# Patient Record
Sex: FEMALE | Race: BLACK OR AFRICAN AMERICAN | NOT HISPANIC OR LATINO | Employment: FULL TIME | URBAN - METROPOLITAN AREA
[De-identification: names, ages, dates, MRNs, and addresses within clinical notes are randomized per-mention and may not be internally consistent; named-entity substitution may affect disease eponyms.]

---

## 2023-04-14 PROBLEM — M25.551 RIGHT HIP PAIN: Status: ACTIVE | Noted: 2023-04-14

## 2023-04-14 PROBLEM — J30.2 ALLERGIC RHINOCONJUNCTIVITIS, SEASONAL AND PERENNIAL: Status: ACTIVE | Noted: 2023-04-14

## 2023-04-14 PROBLEM — M54.16 LUMBAR RADICULITIS: Status: ACTIVE | Noted: 2023-04-14

## 2023-04-14 PROBLEM — H52.203 ASTIGMATISM, BILATERAL: Status: ACTIVE | Noted: 2023-04-14

## 2023-04-14 PROBLEM — R10.11 COLICKY RUQ ABDOMINAL PAIN: Status: ACTIVE | Noted: 2023-04-14

## 2023-04-14 PROBLEM — G47.00 INSOMNIA: Status: ACTIVE | Noted: 2023-04-14

## 2023-04-14 PROBLEM — M16.11 PRIMARY LOCALIZED OSTEOARTHRITIS OF RIGHT HIP: Status: ACTIVE | Noted: 2023-04-14

## 2023-04-14 PROBLEM — M16.12 ARTHRITIS OF LEFT HIP: Status: ACTIVE | Noted: 2023-04-14

## 2023-04-14 PROBLEM — H18.603 KERATOCONUS OF BOTH EYES: Status: ACTIVE | Noted: 2023-04-14

## 2023-04-14 PROBLEM — Z98.84 BARIATRIC SURGERY STATUS: Status: ACTIVE | Noted: 2023-04-14

## 2023-04-14 PROBLEM — H52.203 MYOPIA OF BOTH EYES WITH ASTIGMATISM: Status: ACTIVE | Noted: 2023-04-14

## 2023-04-14 PROBLEM — E55.9 VITAMIN D DEFICIENCY: Status: ACTIVE | Noted: 2023-04-14

## 2023-04-14 PROBLEM — H52.13 MYOPIA OF BOTH EYES WITH ASTIGMATISM: Status: ACTIVE | Noted: 2023-04-14

## 2023-04-14 PROBLEM — J30.89 ALLERGIC RHINOCONJUNCTIVITIS, SEASONAL AND PERENNIAL: Status: ACTIVE | Noted: 2023-04-14

## 2023-04-14 PROBLEM — R06.83 SNORING: Status: ACTIVE | Noted: 2023-04-14

## 2023-04-14 PROBLEM — J30.2 SEASONAL ALLERGIES: Status: ACTIVE | Noted: 2023-04-14

## 2023-04-14 PROBLEM — S82.842A CLOSED BIMALLEOLAR FRACTURE OF LEFT ANKLE: Status: ACTIVE | Noted: 2023-04-14

## 2023-04-14 PROBLEM — R14.3 EXCESSIVE GAS: Status: ACTIVE | Noted: 2023-04-14

## 2023-04-14 PROBLEM — N93.9 ABNORMAL UTERINE BLEEDING: Status: ACTIVE | Noted: 2023-04-14

## 2023-04-14 PROBLEM — J45.909 ASTHMA (HHS-HCC): Status: ACTIVE | Noted: 2023-04-14

## 2023-04-14 PROBLEM — D21.9 FIBROIDS: Status: ACTIVE | Noted: 2023-04-14

## 2023-04-14 PROBLEM — R10.10 UPPER ABDOMINAL PAIN: Status: ACTIVE | Noted: 2023-04-14

## 2023-04-14 PROBLEM — Z98.84 S/P GASTRIC BYPASS: Status: ACTIVE | Noted: 2023-04-14

## 2023-04-14 PROBLEM — E87.20 ACID EXCESS: Status: ACTIVE | Noted: 2023-04-14

## 2023-04-14 PROBLEM — R26.9 ABNORMAL GAIT: Status: ACTIVE | Noted: 2023-04-14

## 2023-04-14 PROBLEM — F90.9 ATTENTION-DEFICIT/HYPERACTIVITY DISORDER: Status: ACTIVE | Noted: 2023-04-14

## 2023-04-14 PROBLEM — E22.1 HYPERPROLACTINEMIA (MULTI): Status: ACTIVE | Noted: 2023-04-14

## 2023-04-14 PROBLEM — R92.2 INCONCLUSIVE MAMMOGRAM: Status: ACTIVE | Noted: 2023-04-14

## 2023-04-14 PROBLEM — M51.9 LUMBAR DISC DISEASE: Status: ACTIVE | Noted: 2023-04-14

## 2023-04-14 PROBLEM — D17.30 LIPOMA OF SKIN AND SUBCUTANEOUS TISSUE: Status: ACTIVE | Noted: 2023-04-14

## 2023-04-14 PROBLEM — M79.7 FIBROMYALGIA: Status: ACTIVE | Noted: 2023-04-14

## 2023-04-14 PROBLEM — R73.9 ELEVATED BLOOD SUGAR: Status: ACTIVE | Noted: 2023-04-14

## 2023-04-14 PROBLEM — H10.10 ALLERGIC RHINOCONJUNCTIVITIS, SEASONAL AND PERENNIAL: Status: ACTIVE | Noted: 2023-04-14

## 2023-04-14 PROBLEM — R79.89 LOW SERUM CORTISOL LEVEL: Status: ACTIVE | Noted: 2023-04-14

## 2023-04-14 PROBLEM — Q45.3 ABNORMALITY OF PANCREATIC DUCT: Status: ACTIVE | Noted: 2023-04-14

## 2023-04-14 PROBLEM — F41.9 ANXIETY: Status: ACTIVE | Noted: 2023-04-14

## 2023-04-14 PROBLEM — Z98.84 HISTORY OF ROUX-EN-Y GASTRIC BYPASS: Status: ACTIVE | Noted: 2023-04-14

## 2023-04-14 PROBLEM — M47.816 LUMBAR FACET ARTHROPATHY: Status: ACTIVE | Noted: 2023-04-14

## 2023-04-14 RX ORDER — IPRATROPIUM BROMIDE AND ALBUTEROL SULFATE 2.5; .5 MG/3ML; MG/3ML
3 SOLUTION RESPIRATORY (INHALATION) EVERY 4 HOURS PRN
COMMUNITY
Start: 2023-03-31 | End: 2023-04-17 | Stop reason: ALTCHOICE

## 2023-04-14 RX ORDER — FLUTICASONE PROPIONATE 50 MCG
1 SPRAY, SUSPENSION (ML) NASAL
COMMUNITY
Start: 2023-03-31 | End: 2023-04-17 | Stop reason: SDUPTHER

## 2023-04-14 RX ORDER — VALACYCLOVIR HYDROCHLORIDE 500 MG/1
1 TABLET, FILM COATED ORAL DAILY
COMMUNITY
Start: 2019-03-04 | End: 2023-04-17 | Stop reason: SDUPTHER

## 2023-04-14 RX ORDER — ESCITALOPRAM OXALATE 10 MG/1
1 TABLET ORAL DAILY
COMMUNITY
Start: 2022-09-21 | End: 2023-04-17

## 2023-04-14 RX ORDER — ASPIRIN 325 MG
TABLET, DELAYED RELEASE (ENTERIC COATED) ORAL
COMMUNITY
End: 2023-04-17 | Stop reason: ALTCHOICE

## 2023-04-14 RX ORDER — ACETAMINOPHEN 500 MG
1 TABLET ORAL EVERY 4 HOURS PRN
COMMUNITY
Start: 2023-03-13 | End: 2023-04-19 | Stop reason: SDUPTHER

## 2023-04-14 RX ORDER — ACETAMINOPHEN, DIPHENHYDRAMINE HCL, PHENYLEPHRINE HCL 325; 25; 5 MG/1; MG/1; MG/1
TABLET ORAL
COMMUNITY
Start: 2021-05-06 | End: 2023-08-14 | Stop reason: SDUPTHER

## 2023-04-14 RX ORDER — TALC
1 POWDER (GRAM) TOPICAL NIGHTLY
COMMUNITY
Start: 2022-03-25 | End: 2023-04-17 | Stop reason: ALTCHOICE

## 2023-04-14 RX ORDER — FLUTICASONE PROPIONATE 50 MCG
SPRAY, SUSPENSION (ML) NASAL
COMMUNITY
Start: 2014-11-04 | End: 2023-04-17 | Stop reason: SDUPTHER

## 2023-04-14 RX ORDER — FERROUS SULFATE 325(65) MG
1 TABLET ORAL DAILY
COMMUNITY
End: 2023-04-17 | Stop reason: SDUPTHER

## 2023-04-14 RX ORDER — MONTELUKAST SODIUM 10 MG/1
1 TABLET ORAL NIGHTLY
COMMUNITY
End: 2023-04-17 | Stop reason: SDUPTHER

## 2023-04-14 RX ORDER — ASPIRIN 81 MG/1
1 TABLET ORAL 2 TIMES DAILY
COMMUNITY
Start: 2022-11-11 | End: 2023-04-17 | Stop reason: ALTCHOICE

## 2023-04-14 RX ORDER — MOMETASONE FUROATE AND FORMOTEROL FUMARATE DIHYDRATE 50; 5 UG/1; UG/1
AEROSOL RESPIRATORY (INHALATION) 2 TIMES DAILY
COMMUNITY
End: 2023-04-17 | Stop reason: ALTCHOICE

## 2023-04-14 RX ORDER — AMOXICILLIN 500 MG/1
CAPSULE ORAL
COMMUNITY
Start: 2023-02-23 | End: 2023-04-17 | Stop reason: ALTCHOICE

## 2023-04-14 RX ORDER — AZELASTINE HYDROCHLORIDE 0.5 MG/ML
SOLUTION/ DROPS OPHTHALMIC
COMMUNITY
End: 2023-04-17 | Stop reason: ALTCHOICE

## 2023-04-14 RX ORDER — LORATADINE 10 MG/1
10 TABLET ORAL
Qty: 30 TABLET | Refills: 71 | COMMUNITY
Start: 2017-06-06 | End: 2023-04-17 | Stop reason: SDUPTHER

## 2023-04-14 RX ORDER — MOMETASONE FUROATE AND FORMOTEROL FUMARATE DIHYDRATE 200; 5 UG/1; UG/1
1 AEROSOL RESPIRATORY (INHALATION) 2 TIMES DAILY
COMMUNITY
Start: 2023-03-31 | End: 2023-04-17 | Stop reason: SDUPTHER

## 2023-04-14 RX ORDER — ALBUTEROL SULFATE 0.83 MG/ML
2.5 SOLUTION RESPIRATORY (INHALATION) EVERY 4 HOURS PRN
COMMUNITY
Start: 2012-10-05 | End: 2024-05-20 | Stop reason: SDUPTHER

## 2023-04-14 RX ORDER — IBUPROFEN 600 MG/1
TABLET ORAL EVERY 6 HOURS
COMMUNITY
Start: 2022-03-29 | End: 2023-04-17 | Stop reason: ALTCHOICE

## 2023-04-14 RX ORDER — OMALIZUMAB 202.5 MG/1.4ML
INJECTION, SOLUTION SUBCUTANEOUS
COMMUNITY
Start: 2019-03-28 | End: 2023-04-17 | Stop reason: SDUPTHER

## 2023-04-14 RX ORDER — AMOXICILLIN AND CLAVULANATE POTASSIUM 875; 125 MG/1; MG/1
1 TABLET, FILM COATED ORAL 2 TIMES DAILY
COMMUNITY
Start: 2023-03-27 | End: 2023-04-17 | Stop reason: ALTCHOICE

## 2023-04-14 RX ORDER — MOMETASONE FUROATE AND FORMOTEROL FUMARATE DIHYDRATE 200; 5 UG/1; UG/1
2 AEROSOL RESPIRATORY (INHALATION) DAILY
COMMUNITY
Start: 2020-04-13 | End: 2023-04-17 | Stop reason: SDUPTHER

## 2023-04-14 RX ORDER — OMALIZUMAB 150 MG/ML
INJECTION, SOLUTION SUBCUTANEOUS
COMMUNITY
End: 2024-05-20

## 2023-04-17 ENCOUNTER — LAB (OUTPATIENT)
Dept: LAB | Facility: LAB | Age: 48
End: 2023-04-17
Payer: COMMERCIAL

## 2023-04-17 ENCOUNTER — OFFICE VISIT (OUTPATIENT)
Dept: PRIMARY CARE | Facility: CLINIC | Age: 48
End: 2023-04-17
Payer: COMMERCIAL

## 2023-04-17 VITALS
SYSTOLIC BLOOD PRESSURE: 120 MMHG | TEMPERATURE: 96.7 F | HEIGHT: 67 IN | BODY MASS INDEX: 23.86 KG/M2 | DIASTOLIC BLOOD PRESSURE: 70 MMHG | WEIGHT: 152 LBS

## 2023-04-17 DIAGNOSIS — R79.89 ELEVATED SERUM FREE T4 LEVEL: ICD-10-CM

## 2023-04-17 DIAGNOSIS — J45.40 MODERATE PERSISTENT ASTHMA WITHOUT COMPLICATION (HHS-HCC): ICD-10-CM

## 2023-04-17 DIAGNOSIS — E87.1 HYPONATREMIA: ICD-10-CM

## 2023-04-17 DIAGNOSIS — R25.2 LEG CRAMPS: ICD-10-CM

## 2023-04-17 DIAGNOSIS — D64.9 ANEMIA, UNSPECIFIED TYPE: ICD-10-CM

## 2023-04-17 DIAGNOSIS — J30.89 ALLERGIC RHINOCONJUNCTIVITIS, SEASONAL AND PERENNIAL: ICD-10-CM

## 2023-04-17 DIAGNOSIS — H10.10 ALLERGIC RHINOCONJUNCTIVITIS, SEASONAL AND PERENNIAL: ICD-10-CM

## 2023-04-17 DIAGNOSIS — B00.9 HSV (HERPES SIMPLEX VIRUS) INFECTION: Primary | ICD-10-CM

## 2023-04-17 DIAGNOSIS — J30.2 ALLERGIC RHINOCONJUNCTIVITIS, SEASONAL AND PERENNIAL: ICD-10-CM

## 2023-04-17 DIAGNOSIS — F51.01 PRIMARY INSOMNIA: ICD-10-CM

## 2023-04-17 PROBLEM — E22.1 HYPERPROLACTINEMIA (MULTI): Status: RESOLVED | Noted: 2023-04-14 | Resolved: 2023-04-17

## 2023-04-17 PROBLEM — S82.842A CLOSED BIMALLEOLAR FRACTURE OF LEFT ANKLE: Status: RESOLVED | Noted: 2023-04-14 | Resolved: 2023-04-17

## 2023-04-17 PROBLEM — R10.10 UPPER ABDOMINAL PAIN: Status: RESOLVED | Noted: 2023-04-14 | Resolved: 2023-04-17

## 2023-04-17 PROBLEM — R10.11 COLICKY RUQ ABDOMINAL PAIN: Status: RESOLVED | Noted: 2023-04-14 | Resolved: 2023-04-17

## 2023-04-17 PROBLEM — Z98.84 BARIATRIC SURGERY STATUS: Status: RESOLVED | Noted: 2023-04-14 | Resolved: 2023-04-17

## 2023-04-17 PROBLEM — R14.3 EXCESSIVE GAS: Status: RESOLVED | Noted: 2023-04-14 | Resolved: 2023-04-17

## 2023-04-17 PROBLEM — R26.9 ABNORMAL GAIT: Status: RESOLVED | Noted: 2023-04-14 | Resolved: 2023-04-17

## 2023-04-17 PROBLEM — Z98.84 S/P GASTRIC BYPASS: Status: RESOLVED | Noted: 2023-04-14 | Resolved: 2023-04-17

## 2023-04-17 PROBLEM — N93.9 ABNORMAL UTERINE BLEEDING: Status: RESOLVED | Noted: 2023-04-14 | Resolved: 2023-04-17

## 2023-04-17 PROBLEM — R73.9 ELEVATED BLOOD SUGAR: Status: RESOLVED | Noted: 2023-04-14 | Resolved: 2023-04-17

## 2023-04-17 PROBLEM — M25.551 RIGHT HIP PAIN: Status: RESOLVED | Noted: 2023-04-14 | Resolved: 2023-04-17

## 2023-04-17 PROCEDURE — 83735 ASSAY OF MAGNESIUM: CPT

## 2023-04-17 PROCEDURE — 84443 ASSAY THYROID STIM HORMONE: CPT

## 2023-04-17 PROCEDURE — 36415 COLL VENOUS BLD VENIPUNCTURE: CPT

## 2023-04-17 PROCEDURE — 80053 COMPREHEN METABOLIC PANEL: CPT

## 2023-04-17 PROCEDURE — 84436 ASSAY OF TOTAL THYROXINE: CPT

## 2023-04-17 PROCEDURE — 99214 OFFICE O/P EST MOD 30 MIN: CPT

## 2023-04-17 RX ORDER — LORATADINE 10 MG/1
10 TABLET ORAL
Qty: 90 TABLET | Refills: 0 | Status: SHIPPED | OUTPATIENT
Start: 2023-04-17 | End: 2023-08-14 | Stop reason: SDUPTHER

## 2023-04-17 RX ORDER — VALACYCLOVIR HYDROCHLORIDE 500 MG/1
500 TABLET, FILM COATED ORAL DAILY
Qty: 90 TABLET | Refills: 0 | Status: SHIPPED | OUTPATIENT
Start: 2023-04-17 | End: 2023-08-09

## 2023-04-17 RX ORDER — FLUTICASONE PROPIONATE 50 MCG
2 SPRAY, SUSPENSION (ML) NASAL
Qty: 16 G | Refills: 2 | Status: SHIPPED | OUTPATIENT
Start: 2023-04-17

## 2023-04-17 RX ORDER — LORATADINE 10 MG/1
10 TABLET ORAL DAILY
COMMUNITY
Start: 2022-11-02 | End: 2023-04-17 | Stop reason: ALTCHOICE

## 2023-04-17 RX ORDER — TRAZODONE HYDROCHLORIDE 50 MG/1
50 TABLET ORAL NIGHTLY PRN
Qty: 90 TABLET | Refills: 0 | Status: SHIPPED | OUTPATIENT
Start: 2023-04-17 | End: 2024-04-16

## 2023-04-17 RX ORDER — MONTELUKAST SODIUM 10 MG/1
10 TABLET ORAL NIGHTLY
Qty: 90 TABLET | Refills: 0 | Status: SHIPPED | OUTPATIENT
Start: 2023-04-17

## 2023-04-17 RX ORDER — FERROUS SULFATE 325(65) MG
1 TABLET ORAL DAILY
Qty: 90 TABLET | Refills: 0 | Status: SHIPPED | OUTPATIENT
Start: 2023-04-17 | End: 2023-08-14 | Stop reason: SDUPTHER

## 2023-04-17 RX ORDER — MOMETASONE FUROATE AND FORMOTEROL FUMARATE DIHYDRATE 200; 5 UG/1; UG/1
1 AEROSOL RESPIRATORY (INHALATION)
Qty: 13 G | Refills: 2 | Status: SHIPPED | OUTPATIENT
Start: 2023-04-17 | End: 2023-05-17

## 2023-04-17 NOTE — PROGRESS NOTES
"Subjective   Patient ID: Deirdre Rodriguez is a 47 y.o. female who presents for Follow-up.  HPI  Patient is a 47 year old AA female with PMH of right hip replacement, asthma, insomnia, presents for follow up.   Last week took amoxacillin and prednisone for asthma, was prescribed by allergy for asthma flare.   Back to baseline at this time.   Last visit was having poor sleep, was waking in the middle of the night and having difficulty falling back asleep. Was napping significantly during the day. Now not napping frequently, happy with trazodone.   Has not been using CPAP during the night, uncomfortable with the mask. Daytime fatigue.     Had lasb drawn by allergy in February, sodium was 126, will repeat today. Has been noticing pain in the hands     All systems have been reviewed and are negative for complaint other than those mentioned in the HPI.     Objective   /70 (BP Location: Left arm, Patient Position: Sitting, BP Cuff Size: Adult)   Temp 35.9 °C (96.7 °F) (Temporal)   Ht 1.702 m (5' 7\")   Wt 68.9 kg (152 lb)   BMI 23.81 kg/m²    Physical Exam  Constitutional:       General: She is awake.      Appearance: Normal appearance.   HENT:      Head: Normocephalic and atraumatic.   Eyes:      Extraocular Movements: Extraocular movements intact.      Pupils: Pupils are equal, round, and reactive to light.   Cardiovascular:      Rate and Rhythm: Normal rate and regular rhythm.      Heart sounds: S1 normal and S2 normal. No murmur heard.  Pulmonary:      Effort: Pulmonary effort is normal.      Breath sounds: Normal breath sounds.   Musculoskeletal:      Cervical back: Normal range of motion and neck supple.      Right lower leg: No edema.      Left lower leg: No edema.   Skin:     General: Skin is warm and dry.   Neurological:      General: No focal deficit present.      Mental Status: She is alert and oriented to person, place, and time.   Psychiatric:         Mood and Affect: Mood and affect normal.         " Behavior: Behavior normal. Behavior is cooperative.         Thought Content: Thought content normal.         Judgment: Judgment normal.     Deirdre was seen today for follow-up.  Diagnoses and all orders for this visit:  HSV (herpes simplex virus) infection (Primary)  -     Stable, no recent flares  - valACYclovir (Valtrex) 500 mg tablet; Take 1 tablet (500 mg) by mouth once daily.  Moderate persistent asthma without complication  -     Recent exacerbation, cleared with prednisone and abx.   -  Will continue to monitor.   - mometasone-formoterol (Dulera) 200-5 mcg/actuation inhaler; Inhale 1 puff  in the morning and 1 puff in the evening.  -     montelukast (Singulair) 10 mg tablet; Take 1 tablet (10 mg) by mouth once daily at bedtime.  Allergic rhinoconjunctivitis, seasonal and perennial  -    Stable, continue current meds.   -  loratadine (Claritin) 10 mg tablet; Take 1 tablet (10 mg) by mouth once daily.  -     fluticasone (Flonase) 50 mcg/actuation nasal spray; Administer 2 sprays into each nostril once daily.  Anemia, unspecified type  -     Stable, continue current meds  - ferrous sulfate (Iron, ferrous sulfate,) 325 (65 Fe) MG tablet; Take 1 tablet (325 mg) by mouth once daily.  Hyponatremia  -     Hyponatremia noted on labs drawn by allergist. Will redraw today. Patient does report frequent leg cramps, has recently started taking potassium and magnesium supplements.   - Comprehensive Metabolic Panel; Future  Leg cramps  -     Leg cramps at night for the past 2 months. Will check electrolytes and magnesium. Not occurring while walking, low concern for claudication  - Magnesium; Future  Elevated serum free T4 level  -     Elevated T4 noted on last lab draw, will redraw.   - TSH; Future  -     T4; Future  Primary insomnia  -    Saw sleep medicine, improved on trazodone. Continue current meds.   -  traZODone (Desyrel) 50 mg tablet; Take 1 tablet (50 mg) by mouth as needed at bedtime for sleep.    Follow up in 3  months

## 2023-04-18 LAB
ALANINE AMINOTRANSFERASE (SGPT) (U/L) IN SER/PLAS: 45 U/L (ref 7–45)
ALBUMIN (G/DL) IN SER/PLAS: 3.9 G/DL (ref 3.4–5)
ALKALINE PHOSPHATASE (U/L) IN SER/PLAS: 40 U/L (ref 33–110)
ANION GAP IN SER/PLAS: 12 MMOL/L (ref 10–20)
ASPARTATE AMINOTRANSFERASE (SGOT) (U/L) IN SER/PLAS: 45 U/L (ref 9–39)
BILIRUBIN TOTAL (MG/DL) IN SER/PLAS: 0.3 MG/DL (ref 0–1.2)
CALCIUM (MG/DL) IN SER/PLAS: 8.8 MG/DL (ref 8.6–10.6)
CARBON DIOXIDE, TOTAL (MMOL/L) IN SER/PLAS: 27 MMOL/L (ref 21–32)
CHLORIDE (MMOL/L) IN SER/PLAS: 106 MMOL/L (ref 98–107)
CREATININE (MG/DL) IN SER/PLAS: 0.76 MG/DL (ref 0.5–1.05)
GFR FEMALE: >90 ML/MIN/1.73M2
GLUCOSE (MG/DL) IN SER/PLAS: 89 MG/DL (ref 74–99)
MAGNESIUM (MG/DL) IN SER/PLAS: 2.25 MG/DL (ref 1.6–2.4)
POTASSIUM (MMOL/L) IN SER/PLAS: 4.6 MMOL/L (ref 3.5–5.3)
PROTEIN TOTAL: 6.3 G/DL (ref 6.4–8.2)
SODIUM (MMOL/L) IN SER/PLAS: 140 MMOL/L (ref 136–145)
THYROTROPIN (MIU/L) IN SER/PLAS BY DETECTION LIMIT <= 0.05 MIU/L: 0.92 MIU/L (ref 0.44–3.98)
THYROXINE (T4) (UG/DL) IN SER/PLAS: 5.8 UG/DL (ref 4.5–11.1)
UREA NITROGEN (MG/DL) IN SER/PLAS: 14 MG/DL (ref 6–23)

## 2023-04-19 DIAGNOSIS — R52 PAIN: Primary | ICD-10-CM

## 2023-04-19 RX ORDER — IBUPROFEN 600 MG/1
600 TABLET ORAL EVERY 8 HOURS PRN
Qty: 30 TABLET | Refills: 2 | OUTPATIENT
Start: 2023-04-19

## 2023-04-19 RX ORDER — ACETAMINOPHEN 500 MG
500 TABLET ORAL EVERY 4 HOURS PRN
Qty: 30 TABLET | Refills: 2 | Status: SHIPPED | OUTPATIENT
Start: 2023-04-19

## 2023-07-17 ENCOUNTER — APPOINTMENT (OUTPATIENT)
Dept: PRIMARY CARE | Facility: CLINIC | Age: 48
End: 2023-07-17
Payer: COMMERCIAL

## 2023-08-08 DIAGNOSIS — B00.9 HSV (HERPES SIMPLEX VIRUS) INFECTION: ICD-10-CM

## 2023-08-09 RX ORDER — VALACYCLOVIR HYDROCHLORIDE 500 MG/1
500 TABLET, FILM COATED ORAL DAILY
Qty: 90 TABLET | Refills: 0 | Status: SHIPPED | OUTPATIENT
Start: 2023-08-09 | End: 2023-08-14 | Stop reason: SDUPTHER

## 2023-08-14 ENCOUNTER — OFFICE VISIT (OUTPATIENT)
Dept: PRIMARY CARE | Facility: CLINIC | Age: 48
End: 2023-08-14
Payer: COMMERCIAL

## 2023-08-14 VITALS — DIASTOLIC BLOOD PRESSURE: 70 MMHG | SYSTOLIC BLOOD PRESSURE: 115 MMHG

## 2023-08-14 DIAGNOSIS — J30.2 ALLERGIC RHINOCONJUNCTIVITIS, SEASONAL AND PERENNIAL: ICD-10-CM

## 2023-08-14 DIAGNOSIS — D64.9 ANEMIA, UNSPECIFIED TYPE: ICD-10-CM

## 2023-08-14 DIAGNOSIS — B00.9 HSV (HERPES SIMPLEX VIRUS) INFECTION: ICD-10-CM

## 2023-08-14 DIAGNOSIS — J30.89 ALLERGIC RHINOCONJUNCTIVITIS, SEASONAL AND PERENNIAL: ICD-10-CM

## 2023-08-14 DIAGNOSIS — H10.10 ALLERGIC RHINOCONJUNCTIVITIS, SEASONAL AND PERENNIAL: ICD-10-CM

## 2023-08-14 PROCEDURE — 1036F TOBACCO NON-USER: CPT

## 2023-08-14 PROCEDURE — 99213 OFFICE O/P EST LOW 20 MIN: CPT

## 2023-08-14 RX ORDER — FERROUS SULFATE 325(65) MG
1 TABLET ORAL DAILY
Qty: 90 TABLET | Refills: 0 | Status: SHIPPED | OUTPATIENT
Start: 2023-08-14 | End: 2023-11-13 | Stop reason: SDUPTHER

## 2023-08-14 RX ORDER — ACETAMINOPHEN, DIPHENHYDRAMINE HCL, PHENYLEPHRINE HCL 325; 25; 5 MG/1; MG/1; MG/1
1 TABLET ORAL
Qty: 90 TABLET | Refills: 0 | Status: SHIPPED | OUTPATIENT
Start: 2023-08-14 | End: 2023-10-27

## 2023-08-14 RX ORDER — VALACYCLOVIR HYDROCHLORIDE 500 MG/1
500 TABLET, FILM COATED ORAL DAILY
Qty: 90 TABLET | Refills: 0 | Status: SHIPPED | OUTPATIENT
Start: 2023-08-14 | End: 2023-11-13 | Stop reason: SDUPTHER

## 2023-08-14 RX ORDER — LORATADINE 10 MG/1
10 TABLET ORAL
Qty: 90 TABLET | Refills: 0 | Status: SHIPPED | OUTPATIENT
Start: 2023-08-14 | End: 2023-11-12

## 2023-08-14 ASSESSMENT — LIFESTYLE VARIABLES
HOW MANY STANDARD DRINKS CONTAINING ALCOHOL DO YOU HAVE ON A TYPICAL DAY: 1 OR 2
HOW OFTEN DO YOU HAVE SIX OR MORE DRINKS ON ONE OCCASION: NEVER
SKIP TO QUESTIONS 9-10: 1
AUDIT-C TOTAL SCORE: 2
HOW OFTEN DO YOU HAVE A DRINK CONTAINING ALCOHOL: 2-4 TIMES A MONTH

## 2023-08-14 ASSESSMENT — PATIENT HEALTH QUESTIONNAIRE - PHQ9
2. FEELING DOWN, DEPRESSED OR HOPELESS: NOT AT ALL
1. LITTLE INTEREST OR PLEASURE IN DOING THINGS: NOT AT ALL
SUM OF ALL RESPONSES TO PHQ9 QUESTIONS 1 & 2: 0

## 2023-08-14 NOTE — PROGRESS NOTES
Subjective   Patient ID: Deirdre Rodriguez is a 47 y.o. female who presents for Follow-up.  HPI  Patient is a 47 year old AA female with PMH of right hip replacement, severe allergic asthma, insomnia, presents for follow up.     Asthma: Dulera, singulair, albuterol PRN. Sees pulm at [facility], doing well.   Insomnia: trazodone --> hasn't been needing it, sleeping without it.   Allergic rhinitis: claritin, flonase, omalizumab injection, stable.   HSV: valacyclovir 500mg daily, no recent flares.     Due for pap, will contact OBGYN.     All systems have been reviewed and are negative for complaint other than those mentioned in the HPI.     Objective   /70 (BP Location: Left arm, Patient Position: Sitting, BP Cuff Size: Adult)    Physical Exam  Constitutional:       General: She is awake.      Appearance: Normal appearance.   HENT:      Head: Normocephalic and atraumatic.   Eyes:      Extraocular Movements: Extraocular movements intact.      Pupils: Pupils are equal, round, and reactive to light.   Cardiovascular:      Rate and Rhythm: Normal rate and regular rhythm.      Heart sounds: S1 normal and S2 normal. No murmur heard.  Pulmonary:      Effort: Pulmonary effort is normal.      Breath sounds: Normal breath sounds.   Musculoskeletal:      Cervical back: Normal range of motion and neck supple.      Right lower leg: No edema.      Left lower leg: No edema.   Skin:     General: Skin is warm and dry.   Neurological:      General: No focal deficit present.      Mental Status: She is alert and oriented to person, place, and time.   Psychiatric:         Mood and Affect: Mood and affect normal.         Behavior: Behavior normal. Behavior is cooperative.         Thought Content: Thought content normal.         Judgment: Judgment normal.   Deirdre was seen today for follow-up.  Diagnoses and all orders for this visit:  HSV (herpes simplex virus) infection  -     Stable, meds refilled. No recent lesia.   - valACYclovir  (Valtrex) 500 mg tablet; Take 1 tablet (500 mg) by mouth once daily.  Allergic rhinoconjunctivitis, seasonal and perennial  -    Stable, receiving allergy injections, continue current management.   -  loratadine (Claritin) 10 mg tablet; Take 1 tablet (10 mg) by mouth once daily.  Anemia, unspecified type  -     Stable, labs looked great last check. Repeat next appointment.   - cyanocobalamin, vitamin B-12, 5,000 mcg tablet, sublingual; Place 1 tablet under the tongue once daily.  -     ferrous sulfate (Iron, ferrous sulfate,) 325 (65 Fe) MG tablet; Take 1 tablet (325 mg) by mouth once daily.    Follow up in 3 months.

## 2023-10-26 DIAGNOSIS — D64.9 ANEMIA, UNSPECIFIED TYPE: ICD-10-CM

## 2023-10-27 RX ORDER — NICOTINE POLACRILEX 2 MG
LOZENGE BUCCAL
Qty: 90 TABLET | Refills: 0 | Status: SHIPPED | OUTPATIENT
Start: 2023-10-27

## 2023-11-07 ENCOUNTER — OFFICE VISIT (OUTPATIENT)
Dept: ORTHOPEDIC SURGERY | Facility: CLINIC | Age: 48
End: 2023-11-07
Payer: COMMERCIAL

## 2023-11-07 ENCOUNTER — ANCILLARY PROCEDURE (OUTPATIENT)
Dept: RADIOLOGY | Facility: CLINIC | Age: 48
End: 2023-11-07
Payer: COMMERCIAL

## 2023-11-07 DIAGNOSIS — Z96.642 AFTERCARE FOLLOWING LEFT HIP JOINT REPLACEMENT SURGERY: ICD-10-CM

## 2023-11-07 DIAGNOSIS — Z47.1 AFTERCARE FOLLOWING LEFT HIP JOINT REPLACEMENT SURGERY: ICD-10-CM

## 2023-11-07 PROCEDURE — 1036F TOBACCO NON-USER: CPT | Performed by: STUDENT IN AN ORGANIZED HEALTH CARE EDUCATION/TRAINING PROGRAM

## 2023-11-07 PROCEDURE — 73502 X-RAY EXAM HIP UNI 2-3 VIEWS: CPT | Mod: LEFT SIDE | Performed by: RADIOLOGY

## 2023-11-07 PROCEDURE — 73502 X-RAY EXAM HIP UNI 2-3 VIEWS: CPT | Mod: LT,FY

## 2023-11-07 PROCEDURE — 99213 OFFICE O/P EST LOW 20 MIN: CPT | Mod: 25 | Performed by: STUDENT IN AN ORGANIZED HEALTH CARE EDUCATION/TRAINING PROGRAM

## 2023-11-07 PROCEDURE — 99213 OFFICE O/P EST LOW 20 MIN: CPT | Performed by: STUDENT IN AN ORGANIZED HEALTH CARE EDUCATION/TRAINING PROGRAM

## 2023-11-07 NOTE — PROGRESS NOTES
Diagnosis: End stage OA Left hip  Procedure Performed: Left total hip arthroplasty   Date of Surgery: November 11, 2022     Subjective:  BRANDY RODRIGUEZ is here for regularly scheduled follow-up after the above procedure. Ms. Rodriguez continues to be satisfied with the outcome following her hip replacement.  She has returned to full activities without limitation.  She has occasional achy pain.  She does not complain of any instability.  No interval fall or trauma. The patient denies: fevers, chills, incisional drainage, joint instability, chest or calf pain, shortness of breath, and night sweats.      There has been no interval change in this patient's past medical, surgical, medications, allergies, family history or social history since the most recent visit to a provider within our department.  14 point review of systems was performed, reviewed, and negative except for pertinent positives documented in the history of present illness.    Physical Examination:   General: Patient is alert and oriented x 3 and appears comfortable.  Gait: Patient ambulates with slight antalgic gait.  Wound: Incision is well healed. There is no erythema, incisional drainage, fluctuance, or suture abscess.   Hip Exam: No pain with range of motion of the hip  Knee: Painless ROM of the knee.   Calf: No swelling or tenderness  Able to dorsi-flex and plantar-flex the ankle with appropriate strength. Able to wiggle all toes.   The foot is warm and well perfused with palpable pulses.   Sensation is intact to light touch.   Full strength in her quadriceps.     Radiographs: AP Pelvis, frog lateral shoot through lateral: Left total hip arthroplasty in place. There is no evidence of subsidence, fracture or dislocation. The joint is located. Compared to immediate post-operative x-rays, no change in appearance.      Assessment and Plan: The patient is progressing well approximately 1 year s/p left total hip arthroplasty.  I am satisfied with her  recovery to this point.    1. A thorough discussion was had with the patient concerning the postoperative course and the patient is in agreement with the plan.  2. Continue exercising. While she does not have to abide by strict posterior hip precautions, I reviewed with the patient that she could still dislocate the joint. I reviewed safe ways to perform activities that minimize the likelihood of a dislocation.  3. Tylenol or other nonsteroidal anti-inflammatories as needed/tolerated for pain and stiffness.  4. Reviewed the need for prophylactic antibiotics prior to any dental or other invasive procedures.  5. Follow-up in 3 years with radiographs or sooner if there are any concerns. We discussed that given her young age, she is at increased risk of requiring a revision surgery in the future. She understands to avoid high impact activities and exercise.        ** This office note was dictated using Dragon voice to text software and was not proofread for spelling or grammatical errors *.

## 2023-11-13 ENCOUNTER — TELEMEDICINE (OUTPATIENT)
Dept: PRIMARY CARE | Facility: CLINIC | Age: 48
End: 2023-11-13
Payer: COMMERCIAL

## 2023-11-13 VITALS — BODY MASS INDEX: 24.33 KG/M2 | WEIGHT: 155 LBS | HEIGHT: 67 IN

## 2023-11-13 DIAGNOSIS — D64.9 ANEMIA, UNSPECIFIED TYPE: ICD-10-CM

## 2023-11-13 DIAGNOSIS — B00.9 HSV (HERPES SIMPLEX VIRUS) INFECTION: ICD-10-CM

## 2023-11-13 DIAGNOSIS — J06.9 ACUTE URI: Primary | ICD-10-CM

## 2023-11-13 PROCEDURE — 99214 OFFICE O/P EST MOD 30 MIN: CPT

## 2023-11-13 RX ORDER — FERROUS SULFATE 325(65) MG
1 TABLET ORAL DAILY
Qty: 90 TABLET | Refills: 1 | Status: SHIPPED | OUTPATIENT
Start: 2023-11-13

## 2023-11-13 RX ORDER — VALACYCLOVIR HYDROCHLORIDE 500 MG/1
500 TABLET, FILM COATED ORAL DAILY
Qty: 90 TABLET | Refills: 1 | Status: SHIPPED | OUTPATIENT
Start: 2023-11-13 | End: 2024-05-20 | Stop reason: SDUPTHER

## 2023-11-13 NOTE — PROGRESS NOTES
Subjective   Patient ID: Deirdre Rodriguez is a 48 y.o. female who presents via virtual visit for Follow-up.  An interactive audio and video telecommunication system which permits real time communications between the patient (at the originating site) and provider (at the distant site) was utilized to provide this telehealth service.    Verbal consent was requested and obtained from the patient on the day of encounter.  HPI  Patient is a 47 year old AA female with PMH of right hip replacement, severe allergic asthma, insomnia, presents for follow up.      Asthma: Dulera, singulair, albuterol PRN. Sees pulm at [facility], doing well.   Insomnia: trazodone --> hasn't been needing it, sleeping without it.   Allergic rhinitis: claritin, flonase, omalizumab injection, stable.   HSV: valacyclovir 500mg daily, no recent flares.     All systems have been reviewed and are negative for complaint other than those mentioned in the HPI.     Objective   Physical Exam  Constitutional:       Appearance: Normal appearance.   Pulmonary:      Effort: Pulmonary effort is normal.   Neurological:      General: No focal deficit present.      Mental Status: She is alert and oriented to person, place, and time.   Psychiatric:         Mood and Affect: Mood normal.         Behavior: Behavior normal.     Deirdre was seen today for follow-up.  Diagnoses and all orders for this visit:  Acute URI (Primary)  -     Nasal congestion and cough, no fevers, chills, chest pain, SOB.   - Will treat symptomatically. Continue flonase and antihistamine  - dextromethorphan-guaifenesin (Mucinex DM)  mg 12 hr tablet; Take 1 tablet by mouth every 12 hours for 15 days. Do not crush, chew, or split.  HSV (herpes simplex virus) infection  -    Stable, no recent flares, continue current medication.   -  valACYclovir (Valtrex) 500 mg tablet; Take 1 tablet (500 mg) by mouth once daily.  Anemia, unspecified type  -     ferrous sulfate, 325 mg ferrous sulfate, (Iron,  ferrous sulfate,) tablet; Take 1 tablet (325 mg) by mouth once daily.    Follow up in 4 months.

## 2023-11-29 ENCOUNTER — OFFICE VISIT (OUTPATIENT)
Dept: PRIMARY CARE | Facility: CLINIC | Age: 48
End: 2023-11-29
Payer: COMMERCIAL

## 2023-11-29 ENCOUNTER — LAB (OUTPATIENT)
Dept: LAB | Facility: LAB | Age: 48
End: 2023-11-29
Payer: COMMERCIAL

## 2023-11-29 VITALS — SYSTOLIC BLOOD PRESSURE: 109 MMHG | BODY MASS INDEX: 24.64 KG/M2 | DIASTOLIC BLOOD PRESSURE: 74 MMHG | HEIGHT: 67 IN

## 2023-11-29 DIAGNOSIS — Z71.85 VACCINE COUNSELING: ICD-10-CM

## 2023-11-29 DIAGNOSIS — Z71.85 VACCINE COUNSELING: Primary | ICD-10-CM

## 2023-11-29 LAB — HBV SURFACE AB SER-ACNC: 76.2 MIU/ML

## 2023-11-29 PROCEDURE — 86706 HEP B SURFACE ANTIBODY: CPT

## 2023-11-29 PROCEDURE — 1036F TOBACCO NON-USER: CPT

## 2023-11-29 PROCEDURE — 99213 OFFICE O/P EST LOW 20 MIN: CPT

## 2023-11-29 PROCEDURE — 36415 COLL VENOUS BLD VENIPUNCTURE: CPT

## 2023-11-29 ASSESSMENT — PATIENT HEALTH QUESTIONNAIRE - PHQ9
1. LITTLE INTEREST OR PLEASURE IN DOING THINGS: NOT AT ALL
2. FEELING DOWN, DEPRESSED OR HOPELESS: NOT AT ALL
SUM OF ALL RESPONSES TO PHQ9 QUESTIONS 1 AND 2: 0

## 2023-11-29 NOTE — PROGRESS NOTES
"Subjective   Patient ID: Deirdre Rodriguez is a 48 y.o. female who presents for discus immunization.  HPI  Patient is a 47 year old AA female with PMH of right hip replacement, severe allergic asthma, insomnia, presents for question regarding immunization.      Asthma: Dulera, singulair, albuterol PRN. Se[facility] pulm at [facility], doing well.   Insomnia: trazodone --> hasn't been needing it, sleeping without it.   Allergic rhinitis: claritin, flonase, omalizumab injection  HSV: valacyclovir 500mg daily    States that she received a message from the [facility] that she is overdue for a heptatis B vaccine. Patient is unsure if she has received Hepatitis B vaccination in the past.     All systems have been reviewed and are negative for complaint other than those mentioned in the HPI.     Objective   /74 (BP Location: Left arm, Patient Position: Sitting, BP Cuff Size: Adult)   Ht 1.689 m (5' 6.5\")   BMI 24.64 kg/m²    Physical Exam  Constitutional:       General: She is awake.      Appearance: Normal appearance.   HENT:      Head: Normocephalic and atraumatic.   Eyes:      Extraocular Movements: Extraocular movements intact.      Pupils: Pupils are equal, round, and reactive to light.   Cardiovascular:      Rate and Rhythm: Normal rate and regular rhythm.      Heart sounds: S1 normal and S2 normal. No murmur heard.  Pulmonary:      Effort: Pulmonary effort is normal.      Breath sounds: Normal breath sounds.   Musculoskeletal:      Cervical back: Normal range of motion and neck supple.      Right lower leg: No edema.      Left lower leg: No edema.   Skin:     General: Skin is warm and dry.   Neurological:      General: No focal deficit present.      Mental Status: She is alert and oriented to person, place, and time.   Psychiatric:         Mood and Affect: Mood and affect normal.         Behavior: Behavior normal. Behavior is cooperative.         Thought Content: Thought content normal.         Judgment: Judgment normal. "     Deirdre was seen today for discus immunization.  Diagnoses and all orders for this visit:  Vaccine counseling (Primary)  -     Unsure if she received hep B vaccine or if she needs to  - Will check for immunity  - Hepatitis B surface antibody; Future

## 2024-02-26 ENCOUNTER — APPOINTMENT (OUTPATIENT)
Dept: PRIMARY CARE | Facility: CLINIC | Age: 49
End: 2024-02-26
Payer: COMMERCIAL

## 2024-03-13 ENCOUNTER — OFFICE VISIT (OUTPATIENT)
Dept: PRIMARY CARE | Facility: CLINIC | Age: 49
End: 2024-03-13
Payer: COMMERCIAL

## 2024-03-13 ENCOUNTER — HOSPITAL ENCOUNTER (OUTPATIENT)
Dept: RADIOLOGY | Facility: HOSPITAL | Age: 49
Discharge: HOME | End: 2024-03-13
Payer: COMMERCIAL

## 2024-03-13 ENCOUNTER — LAB (OUTPATIENT)
Dept: LAB | Facility: LAB | Age: 49
End: 2024-03-13
Payer: COMMERCIAL

## 2024-03-13 VITALS — HEIGHT: 67 IN | DIASTOLIC BLOOD PRESSURE: 60 MMHG | BODY MASS INDEX: 24.64 KG/M2 | SYSTOLIC BLOOD PRESSURE: 100 MMHG

## 2024-03-13 DIAGNOSIS — M79.645 CHRONIC PAIN OF LEFT THUMB: ICD-10-CM

## 2024-03-13 DIAGNOSIS — R20.2 TINGLING OF BOTH FEET: Primary | ICD-10-CM

## 2024-03-13 DIAGNOSIS — R20.2 TINGLING OF BOTH FEET: ICD-10-CM

## 2024-03-13 DIAGNOSIS — G89.29 CHRONIC PAIN OF LEFT THUMB: ICD-10-CM

## 2024-03-13 DIAGNOSIS — Z12.31 SCREENING MAMMOGRAM FOR BREAST CANCER: ICD-10-CM

## 2024-03-13 PROBLEM — E87.20 ACID EXCESS: Status: RESOLVED | Noted: 2023-04-14 | Resolved: 2024-03-13

## 2024-03-13 PROBLEM — R92.2 INCONCLUSIVE MAMMOGRAM: Status: RESOLVED | Noted: 2023-04-14 | Resolved: 2024-03-13

## 2024-03-13 LAB
25(OH)D3 SERPL-MCNC: 52 NG/ML (ref 30–100)
ALBUMIN SERPL BCP-MCNC: 4 G/DL (ref 3.4–5)
ALP SERPL-CCNC: 55 U/L (ref 33–110)
ALT SERPL W P-5'-P-CCNC: 30 U/L (ref 7–45)
ANION GAP SERPL CALC-SCNC: 11 MMOL/L (ref 10–20)
AST SERPL W P-5'-P-CCNC: 23 U/L (ref 9–39)
BILIRUB SERPL-MCNC: 0.2 MG/DL (ref 0–1.2)
BUN SERPL-MCNC: 16 MG/DL (ref 6–23)
CALCIUM SERPL-MCNC: 9.1 MG/DL (ref 8.6–10.3)
CHLORIDE SERPL-SCNC: 107 MMOL/L (ref 98–107)
CO2 SERPL-SCNC: 27 MMOL/L (ref 21–32)
CREAT SERPL-MCNC: 0.84 MG/DL (ref 0.5–1.05)
EGFRCR SERPLBLD CKD-EPI 2021: 85 ML/MIN/1.73M*2
EST. AVERAGE GLUCOSE BLD GHB EST-MCNC: 108 MG/DL
GLUCOSE SERPL-MCNC: 91 MG/DL (ref 74–99)
HBA1C MFR BLD: 5.4 %
MAGNESIUM SERPL-MCNC: 2.1 MG/DL (ref 1.6–2.4)
POTASSIUM SERPL-SCNC: 4.7 MMOL/L (ref 3.5–5.3)
PROT SERPL-MCNC: 6.3 G/DL (ref 6.4–8.2)
SODIUM SERPL-SCNC: 140 MMOL/L (ref 136–145)
TSH SERPL-ACNC: 1.37 MIU/L (ref 0.44–3.98)
VIT B12 SERPL-MCNC: 928 PG/ML (ref 211–911)

## 2024-03-13 PROCEDURE — 82607 VITAMIN B-12: CPT

## 2024-03-13 PROCEDURE — 80053 COMPREHEN METABOLIC PANEL: CPT

## 2024-03-13 PROCEDURE — 1036F TOBACCO NON-USER: CPT

## 2024-03-13 PROCEDURE — 73130 X-RAY EXAM OF HAND: CPT | Mod: LT

## 2024-03-13 PROCEDURE — 99214 OFFICE O/P EST MOD 30 MIN: CPT

## 2024-03-13 PROCEDURE — 83036 HEMOGLOBIN GLYCOSYLATED A1C: CPT

## 2024-03-13 PROCEDURE — 73130 X-RAY EXAM OF HAND: CPT | Mod: LEFT SIDE | Performed by: RADIOLOGY

## 2024-03-13 PROCEDURE — 36415 COLL VENOUS BLD VENIPUNCTURE: CPT

## 2024-03-13 PROCEDURE — 82306 VITAMIN D 25 HYDROXY: CPT

## 2024-03-13 PROCEDURE — 84443 ASSAY THYROID STIM HORMONE: CPT

## 2024-03-13 PROCEDURE — 83735 ASSAY OF MAGNESIUM: CPT

## 2024-03-13 ASSESSMENT — PATIENT HEALTH QUESTIONNAIRE - PHQ9
2. FEELING DOWN, DEPRESSED OR HOPELESS: NOT AT ALL
SUM OF ALL RESPONSES TO PHQ9 QUESTIONS 1 AND 2: 0
1. LITTLE INTEREST OR PLEASURE IN DOING THINGS: NOT AT ALL

## 2024-03-13 ASSESSMENT — LIFESTYLE VARIABLES
HOW OFTEN DO YOU HAVE A DRINK CONTAINING ALCOHOL: MONTHLY OR LESS
HOW OFTEN DO YOU HAVE SIX OR MORE DRINKS ON ONE OCCASION: NEVER

## 2024-03-13 NOTE — PROGRESS NOTES
"Subjective   Patient ID: Deirdre Rodriguez is a 48 y.o. female who presents for Follow-up.  HPI  Patient is a 48 year old AA female with PMH of right hip replacement, severe allergic asthma, insomnia, presents for follow up.      Asthma: Dulera, singulair, albuterol PRN. Sees pulm at [facility], doing well.   Insomnia: trazodone --> hasn't been needing it, sleeping without it.   Allergic rhinitis: claritin, flonase, omalizumab injection  HSV: valacyclovir 500mg daily    Reporting that 3 times a week she is having a burning sensation in her right toes. Been occurring for a few months. Usually lasts 10 minutes. Usually occurs when lying down.     Thumb pain --> feels pain at the base of the day. Not worse in morning. No redness or swelling in the joint. Pain lasts for a few minutes. Has been present for a few months. No known injury.     Due for pap/mammogram    All systems have been reviewed and are negative for complaint other than those mentioned in the HPI.     Objective   /60 (BP Location: Left arm, Patient Position: Sitting, BP Cuff Size: Adult)   Ht 1.689 m (5' 6.5\")   LMP 09/11/2021   BMI 24.64 kg/m²    Physical Exam  Constitutional:       General: She is awake.      Appearance: Normal appearance.   HENT:      Head: Normocephalic and atraumatic.   Eyes:      Extraocular Movements: Extraocular movements intact.      Pupils: Pupils are equal, round, and reactive to light.   Cardiovascular:      Rate and Rhythm: Normal rate and regular rhythm.      Heart sounds: S1 normal and S2 normal. No murmur heard.  Pulmonary:      Effort: Pulmonary effort is normal.      Breath sounds: Normal breath sounds.   Musculoskeletal:      Cervical back: Normal range of motion and neck supple.      Right lower leg: No edema.      Left lower leg: No edema.   Skin:     General: Skin is warm and dry.   Neurological:      General: No focal deficit present.      Mental Status: She is alert and oriented to person, place, and time.      " Cranial Nerves: Cranial nerves 2-12 are intact.      Sensory: Sensation is intact.      Motor: Motor function is intact.      Coordination: Coordination is intact.      Deep Tendon Reflexes: Reflexes are normal and symmetric.   Psychiatric:         Mood and Affect: Mood and affect normal.         Behavior: Behavior normal. Behavior is cooperative.         Thought Content: Thought content normal.         Judgment: Judgment normal.     Deirdre was seen today for follow-up.  Diagnoses and all orders for this visit:  Tingling of both feet (Primary)  -     Tingling in feet and toes intermittently  - Screen for diabetes, possibly neuropathy  - Started after noticing black toenail, appears to be fungal infection, will refer to podiatry  - Start with blood work.   - No changes in temperature, cap refill <2 seconds, +2 DP and PT pulses.   - Referral to Podiatry; Future  -     Comprehensive Metabolic Panel; Future  -     TSH with reflex to Free T4 if abnormal; Future  -     Hemoglobin A1C; Future  -     Vitamin B12; Future  -     Vitamin D 25-Hydroxy,Total (for eval of Vitamin D levels); Future  -     Magnesium; Future  Chronic pain of left thumb  -     Patient believes to be arthritis at base of thumb  - Will obtain xray to start  - XR hand left 3+ views; Future  Screening mammogram for breast cancer  -     BI mammo bilateral screening tomosynthesis; Future    Follow up for annual physical or PRN.

## 2024-03-15 RX ORDER — NAPROXEN 500 MG/1
500 TABLET ORAL 2 TIMES DAILY PRN
Qty: 60 TABLET | Refills: 0 | Status: SHIPPED | OUTPATIENT
Start: 2024-03-15 | End: 2024-06-13

## 2024-03-27 ENCOUNTER — HOSPITAL ENCOUNTER (OUTPATIENT)
Dept: RADIOLOGY | Facility: CLINIC | Age: 49
Discharge: HOME | End: 2024-03-27
Payer: COMMERCIAL

## 2024-03-27 VITALS — HEIGHT: 67 IN | BODY MASS INDEX: 24.33 KG/M2 | WEIGHT: 155 LBS

## 2024-03-27 DIAGNOSIS — Z12.31 SCREENING MAMMOGRAM FOR BREAST CANCER: ICD-10-CM

## 2024-03-27 PROCEDURE — 77063 BREAST TOMOSYNTHESIS BI: CPT | Performed by: RADIOLOGY

## 2024-03-27 PROCEDURE — 77067 SCR MAMMO BI INCL CAD: CPT

## 2024-03-27 PROCEDURE — 77067 SCR MAMMO BI INCL CAD: CPT | Performed by: RADIOLOGY

## 2024-05-03 DIAGNOSIS — Z96.641 AFTERCARE FOLLOWING RIGHT HIP JOINT REPLACEMENT SURGERY: Primary | ICD-10-CM

## 2024-05-03 DIAGNOSIS — Z47.1 AFTERCARE FOLLOWING RIGHT HIP JOINT REPLACEMENT SURGERY: Primary | ICD-10-CM

## 2024-05-03 RX ORDER — AMOXICILLIN 500 MG/1
CAPSULE ORAL
Qty: 4 CAPSULE | Refills: 6 | Status: SHIPPED | OUTPATIENT
Start: 2024-05-03

## 2024-05-03 RX ORDER — AMOXICILLIN 500 MG/1
CAPSULE ORAL
Qty: 12 CAPSULE | Refills: 0 | OUTPATIENT
Start: 2024-05-03

## 2024-05-20 ENCOUNTER — OFFICE VISIT (OUTPATIENT)
Dept: OBSTETRICS AND GYNECOLOGY | Facility: CLINIC | Age: 49
End: 2024-05-20
Payer: COMMERCIAL

## 2024-05-20 VITALS — DIASTOLIC BLOOD PRESSURE: 66 MMHG | SYSTOLIC BLOOD PRESSURE: 110 MMHG | BODY MASS INDEX: 25.02 KG/M2 | HEIGHT: 66 IN

## 2024-05-20 DIAGNOSIS — B00.9 HSV (HERPES SIMPLEX VIRUS) INFECTION: ICD-10-CM

## 2024-05-20 DIAGNOSIS — Z91.89 AT HIGH RISK FOR OSTEOPOROSIS: ICD-10-CM

## 2024-05-20 DIAGNOSIS — N89.8 VAGINAL DISCHARGE: ICD-10-CM

## 2024-05-20 DIAGNOSIS — Z01.419 ENCOUNTER FOR ANNUAL ROUTINE GYNECOLOGICAL EXAMINATION: Primary | ICD-10-CM

## 2024-05-20 PROCEDURE — 1036F TOBACCO NON-USER: CPT | Performed by: OBSTETRICS & GYNECOLOGY

## 2024-05-20 PROCEDURE — 99396 PREV VISIT EST AGE 40-64: CPT | Performed by: OBSTETRICS & GYNECOLOGY

## 2024-05-20 PROCEDURE — 87205 SMEAR GRAM STAIN: CPT

## 2024-05-20 RX ORDER — FLUTICASONE PROPIONATE AND SALMETEROL 100; 50 UG/1; UG/1
POWDER RESPIRATORY (INHALATION)
COMMUNITY
Start: 2014-09-17 | End: 2024-05-20

## 2024-05-20 RX ORDER — EPINEPHRINE 0.3 MG/.3ML
INJECTION SUBCUTANEOUS
COMMUNITY
Start: 2024-01-08

## 2024-05-20 RX ORDER — OMEPRAZOLE 40 MG/1
CAPSULE, DELAYED RELEASE ORAL
COMMUNITY
Start: 2019-09-20 | End: 2024-05-20

## 2024-05-20 RX ORDER — MELOXICAM 7.5 MG/1
TABLET ORAL
COMMUNITY
Start: 2019-04-22 | End: 2024-05-20

## 2024-05-20 RX ORDER — BUDESONIDE AND FORMOTEROL FUMARATE DIHYDRATE 80; 4.5 UG/1; UG/1
AEROSOL RESPIRATORY (INHALATION)
COMMUNITY
Start: 2016-10-20 | End: 2024-05-20

## 2024-05-20 RX ORDER — FLUTICASONE FUROATE AND VILANTEROL 100; 25 UG/1; UG/1
POWDER RESPIRATORY (INHALATION)
COMMUNITY
Start: 2017-06-20 | End: 2024-05-20

## 2024-05-20 RX ORDER — KETOTIFEN FUMARATE 0.35 MG/ML
SOLUTION/ DROPS OPHTHALMIC
COMMUNITY
Start: 2018-09-12 | End: 2024-05-20

## 2024-05-20 RX ORDER — ALBUTEROL SULFATE 90 UG/1
AEROSOL, METERED RESPIRATORY (INHALATION)
COMMUNITY
Start: 2023-12-21

## 2024-05-20 RX ORDER — VALACYCLOVIR HYDROCHLORIDE 500 MG/1
500 TABLET, FILM COATED ORAL DAILY
Qty: 90 TABLET | Refills: 1 | Status: SHIPPED | OUTPATIENT
Start: 2024-05-20 | End: 2024-05-20 | Stop reason: WASHOUT

## 2024-05-20 RX ORDER — FLUCONAZOLE 150 MG/1
TABLET ORAL
COMMUNITY
Start: 2020-06-12 | End: 2024-05-20

## 2024-05-20 RX ORDER — LISDEXAMFETAMINE DIMESYLATE 30 MG/1
CAPSULE ORAL
COMMUNITY
Start: 2014-09-17 | End: 2024-05-20

## 2024-05-20 RX ORDER — ZINC GLUCONATE 50 MG
TABLET ORAL
COMMUNITY
Start: 2020-11-02 | End: 2024-05-20

## 2024-05-20 RX ORDER — VALACYCLOVIR HYDROCHLORIDE 500 MG/1
500 TABLET, FILM COATED ORAL DAILY
Qty: 30 TABLET | Refills: 5 | Status: SHIPPED | OUTPATIENT
Start: 2024-05-20 | End: 2024-11-16

## 2024-05-20 ASSESSMENT — ENCOUNTER SYMPTOMS
EYES NEGATIVE: 0
MUSCULOSKELETAL NEGATIVE: 0
CONSTITUTIONAL NEGATIVE: 0
GASTROINTESTINAL NEGATIVE: 0
NEUROLOGICAL NEGATIVE: 0
CARDIOVASCULAR NEGATIVE: 0
ALLERGIC/IMMUNOLOGIC NEGATIVE: 0
ENDOCRINE NEGATIVE: 0
HEMATOLOGIC/LYMPHATIC NEGATIVE: 0
RESPIRATORY NEGATIVE: 0
PSYCHIATRIC NEGATIVE: 0

## 2024-05-20 ASSESSMENT — PAIN SCALES - GENERAL: PAINLEVEL: 0-NO PAIN

## 2024-05-20 NOTE — PROGRESS NOTES
"HPI: Deirdre Rodriguez is a 48 y.o.  here for an annual physical exam.     Recent history of bacterial vaginosis 1-2 months ago, characterized by vaginal odor. Would like to confirm that it has resolved, no recent changes in discharge.     Requests refill on valtrex.     OB history: ,  x1    Gyn history:   Denies sexual concerns   Does not need STI testing      Screening:  - Denies safety concerns  - Physically active, healthy diet   - Up to date on immunizations   - Mammogram: 3/2024  - Colonoscopy: due in   - DEXA: history of ankle fracture while slipping in the snow followed by toe fracture      The patient's past medical, surgical, family and social histories were updated as indicated.    PMH: asthma   PSHx: Rosy-en-Y gastric bypass, breast surgery, open reduction/internal fixation of left ankle, TRH + BS for fibroids  Meds updated in chart   Allergies: updated in chart   Denies family history of endometrial, breast, ovarian, pancreatic cancer  Social: denies tobacco or drug use, occasional alcohol use socially     Objective:  Visit Vitals  /66 (BP Location: Right arm, Patient Position: Sitting, BP Cuff Size: Adult)   Ht 1.676 m (5' 6\")   LMP 2021   BMI 25.02 kg/m²   OB Status Hysterectomy   Smoking Status Never   BSA 1.81 m²      Physical Exam  Constitutional:       Appearance: Normal appearance.   Genitourinary:      Genitourinary Comments: Physiologic white discharge       Right Labia: No rash, tenderness or lesions.     Left Labia: No tenderness, lesions or rash.     Vaginal cuff intact.     Vaginal discharge present.      No vaginal tenderness or ulceration.   Breasts:     Breasts are soft.     Right: Normal.      Left: Normal.   HENT:      Head: Normocephalic and atraumatic.      Nose: Nose normal.   Eyes:      Extraocular Movements: Extraocular movements intact.   Cardiovascular:      Rate and Rhythm: Normal rate.   Pulmonary:      Effort: Pulmonary effort is normal. "   Musculoskeletal:         General: Normal range of motion.      Cervical back: Normal range of motion.   Neurological:      General: No focal deficit present.      Mental Status: She is alert.   Skin:     General: Skin is warm and dry.   Psychiatric:         Behavior: Behavior normal.     ASSESSMENT & PLAN  Deirdre Rodriguez is a 48 y.o.  at Unknown here for the following concerns we addressed today:    Problem List Items Addressed This Visit       At high risk for osteoporosis    Relevant Orders    XR DEXA bone density    Encounter for annual routine gynecological examination - Primary    HSV (herpes simplex virus) infection    Relevant Medications    valACYclovir (Valtrex) 500 mg tablet    Vaginal discharge    Relevant Orders    Vaginitis Gram Stain For Bacterial Vaginosis + Yeast     RTC in 1 year or PRN     Seen and discussed with Dr. Kishor Little MD

## 2024-05-21 DIAGNOSIS — B37.9 YEAST INFECTION: Primary | ICD-10-CM

## 2024-05-21 LAB
CLUE CELLS VAG LPF-#/AREA: ABNORMAL /[LPF]
NUGENT SCORE: 3
YEAST VAG WET PREP-#/AREA: PRESENT

## 2024-05-21 RX ORDER — FLUCONAZOLE 150 MG/1
150 TABLET ORAL ONCE
Qty: 1 TABLET | Refills: 0 | Status: SHIPPED | OUTPATIENT
Start: 2024-05-21 | End: 2024-05-21

## 2024-05-31 ENCOUNTER — HOSPITAL ENCOUNTER (OUTPATIENT)
Dept: RADIOLOGY | Facility: CLINIC | Age: 49
Discharge: HOME | End: 2024-05-31
Payer: COMMERCIAL

## 2024-05-31 DIAGNOSIS — Z91.89 AT HIGH RISK FOR OSTEOPOROSIS: ICD-10-CM

## 2024-05-31 PROCEDURE — 77080 DXA BONE DENSITY AXIAL: CPT

## 2024-07-08 ENCOUNTER — APPOINTMENT (OUTPATIENT)
Dept: PODIATRY | Facility: CLINIC | Age: 49
End: 2024-07-08
Payer: COMMERCIAL

## 2024-07-08 DIAGNOSIS — R20.2 TINGLING OF BOTH FEET: ICD-10-CM

## 2024-07-08 PROCEDURE — 1036F TOBACCO NON-USER: CPT | Performed by: PODIATRIST

## 2024-07-08 PROCEDURE — 99203 OFFICE O/P NEW LOW 30 MIN: CPT | Performed by: PODIATRIST

## 2024-07-09 NOTE — PROGRESS NOTES
This is a 48 y.o. female new patient for foot concern    History of Present Illness:   Patient states they are here for foot concern  Has thickness in toenail  Would like to discuss treatment options  Also has experienced tingling in toes  Does not cause pain or inhibit lifestyle.   No other pedal complaints    Past Medical History  Past Medical History:   Diagnosis Date    Abnormal weight loss 05/06/2021    Rapid weight loss    Abnormal weight loss 06/08/2020    Rapid weight loss    Acute vaginitis 05/15/2020    Acute vaginitis    Allergy status to unspecified drugs, medicaments and biological substances     History of seasonal allergies    Anorexia 09/10/2021    Poor appetite    Body mass index (BMI)40.0-44.9, adult 11/04/2019    Body mass index (BMI) of 40.0 to 44.9 in adult    Encounter for gynecological examination (general) (routine) without abnormal findings 07/12/2019    Pap smear, as part of routine gynecological examination    Encounter for gynecological examination (general) (routine) without abnormal findings 05/24/2021    Encounter for annual routine gynecological examination    Encounter for gynecological examination (general) (routine) without abnormal findings     Women's annual routine gynecological examination    Encounter for immunization 09/21/2022    Encounter for administration of vaccine    Encounter for other preprocedural examination 06/28/2019    Pre-operative clearance    Encounter for other specified surgical aftercare 05/13/2022    Encounter for postoperative care    Encounter for pregnancy test, result negative     Urine pregnancy test negative    Encounter for screening for malignant neoplasm of colon 09/10/2021    Screening for colorectal cancer    Encounter for screening for other suspected endocrine disorder 06/08/2020    Screening for endocrine, nutritional, metabolic and immunity disorder    Encounter for screening for other suspected endocrine disorder 06/08/2020    Screening for  endocrine, nutritional, metabolic and immunity disorder    Encounter for screening for other viral diseases 07/26/2021    Encounter for hepatitis C screening test for low risk patient    Flushing 05/25/2021    Flushing    Gluteal tendinitis, left hip 06/28/2021    Gluteal tendinitis of left buttock    Mastodynia 05/07/2014    Breast pain    Menopausal and female climacteric states 05/07/2014    Hot flash, menopausal    Morbid (severe) obesity due to excess calories (Multi) 08/01/2019    Psychological factors affecting morbid obesity    Nausea 09/10/2021    Nausea in adult    Other acute postprocedural pain     Acute post-operative pain    Other amnesia 06/03/2015    Memory difficulty    Other symptoms and signs involving the musculoskeletal system 06/27/2019    Weakness of left lower extremity    Overweight 06/08/2020    Overweight and obesity    Pain in left ankle and joints of left foot 02/24/2020    Chronic pain of left ankle    Pain in unspecified ankle and joints of unspecified foot 06/27/2019    Ankle joint pain    Personal history of (healed) traumatic fracture 06/26/2019    History of fracture of left ankle    Personal history of other diseases of the musculoskeletal system and connective tissue 09/23/2019    History of low back pain    Personal history of other diseases of the nervous system and sense organs 10/14/2019    History of obstructive sleep apnea    Personal history of other infectious and parasitic diseases     History of candidiasis of vagina    Personal history of other infectious and parasitic diseases 07/28/2022    History of herpes genitalis    Personal history of other medical treatment 05/06/2021    History of screening mammography    Personal history of other medical treatment 11/08/2021    History of screening mammography    Personal history of other specified conditions     History of postoperative nausea and vomiting    Unspecified asthma, uncomplicated ([facility]) 09/21/2022    Asthma        Medications and Allergies have been reviewed.    Review Of Systems:  GENERAL: No weight loss, malaise or fevers.  HEENT: Negative for frequent or significant headaches,   RESPIRATORY: Negative for cough, wheezing or shortness of breath.  CARDIOVASCULAR: Negative for chest pain, leg swelling or palpitations.    Physical Exam:  Patient is a pleasant, cooperative, well developed 48 y.o.  adult female. The patient is alert and oriented to time, place and person.   Patient has normal affect and mood.    Examination of Both Lower Extremities:   Objective:   Vasc: DP and PT pulses are palpable bilateral.  CFT is less than 3 seconds bilateral.  Skin temperature is warm to cool proximal to distal bilateral.      Neuro: Vibratory, light touch and proprioception are intact bilateral.      Derm: Nails 1-5 bilateral are intact.  Skin is supple with normal texture and turgor noted.  Webspaces are clean, dry and intact bilateral.  There are no hyperkeratoses, ulcerations, verruca or other lesions noted.      Ortho: Muscle strength is 5/5 for all pedal groups tested.  Contracted distal 2nd digit bl.   1. Tingling of both feet  Referral to Podiatry          Patient exam and eval  Discussed friction of nail causing thickening  Keep trim and filed down  Discussed causes of localized mild neuropathy  Continue to monitor symptoms  Fu if they worsen.   No treatment noted at this time  Patient was in agreement to this plan. All questions answered.      Kayley Gray DPM  [phone]  Option [address]  Fax: [phone]

## 2024-08-07 NOTE — PROGRESS NOTES
Subjective     Deirdre Rodriguez is a 48 y.o. female who presents for the following: Acne.  She states she has been using BP wash twice a day, clindamycin 1% lotion every morning, and tretinoin 0.1% cream every night with very good control of her acne, but she states she ran out of the medications about a month ago and has been breaking out since.  She also notes a raised bump underneath her right eye, which sometimes catches on things and hurts, but it has not changed in any other way, including in size, shape, or color, and it does not itch or bleed.  She denies any other new, changing, or concerning skin lesions; no bleeding, itching, or burning lesions.      Review of Systems:  No other skin or systemic complaints other than what is documented elsewhere in the note.    The following portions of the chart were reviewed this encounter and updated as appropriate:       Skin Cancer History  No skin cancer on file.    Specialty Problems          Dermatology Problems    Lipoma of skin and subcutaneous tissue       Past Dermatologic / Past Relevant Medical History:    - history of acne  - no h/o skin cancer    Family History:    Daughter - acne  No family history of melanoma or skin cancer    Social History:    Her daughter is a patient in our office as well    Allergies:  Patient has no known allergies.    Current Medications / CAM's:    Current Outpatient Medications:     acetaminophen (Tylenol) 500 mg tablet, Take 1 tablet (500 mg) by mouth every 4 hours if needed for mild pain (1 - 3)., Disp: 30 tablet, Rfl: 2    albuterol 90 mcg/actuation inhaler, INHALE TWO PUFFS BY MOUTH EVERY 4 HOURS AS NEEDED AS INSTRUCTED, Disp: , Rfl:     amoxicillin (Amoxil) 500 mg capsule, Take 4 Tablets 1 Hour Prior to Dental Procedure or Cleaning., Disp: 4 capsule, Rfl: 6    benzoyl peroxide 5 % external wash, Apply 1 Application topically once daily. In the morning, Disp: 227 g, Rfl: 11    clindamycin (Cleocin T) 1 % lotion, Apply  topically 2 times a day., Disp: 60 mL, Rfl: 11    EPINEPHrine 0.3 mg/0.3 mL injection syringe, INJECT 0.3 ML INTRAMUSCULARLY AS NEEDED., Disp: , Rfl:     famotidine-Ca carb-mag hydrox (Pepcid Complete) -165 mg chewable tablet, 666335 Medication loratadine 10 mg tablet Allergy Relief (loratadine) 10 mg tablet 10 mg 10/23/2020 Active (Outside), Disp: , Rfl:     ferrous sulfate, 325 mg ferrous sulfate, (Iron, ferrous sulfate,) tablet, Take 1 tablet (325 mg) by mouth once daily., Disp: 90 tablet, Rfl: 1    fluticasone (Flonase) 50 mcg/actuation nasal spray, Administer 2 sprays into each nostril once daily., Disp: 16 g, Rfl: 2    loratadine (Claritin) 10 mg tablet, Take 1 tablet (10 mg) by mouth once daily., Disp: 90 tablet, Rfl: 0    mometasone-formoterol (Dulera) 200-5 mcg/actuation inhaler, Inhale 1 puff  in the morning and 1 puff in the evening., Disp: 13 g, Rfl: 2    montelukast (Singulair) 10 mg tablet, Take 1 tablet (10 mg) by mouth once daily at bedtime., Disp: 90 tablet, Rfl: 0    traZODone (Desyrel) 50 mg tablet, Take 1 tablet (50 mg) by mouth as needed at bedtime for sleep., Disp: 90 tablet, Rfl: 0    tretinoin (Retin-A) 0.1 % cream, Apply 1 Application topically once daily at bedtime. Start 1-2 nights per week 1-2 weeks, inc to every other night, then every night as tolerated, Disp: 45 g, Rfl: 11    valACYclovir (Valtrex) 500 mg tablet, Take 1 tablet (500 mg) by mouth once daily., Disp: 30 tablet, Rfl: 5    Vitamin B-12 5,000 mcg tablet, sublingual, DISSOLVE ONE TABLET UNDER TONGUE ONCE DAILY, Disp: 90 tablet, Rfl: 0     Objective   Well appearing patient in no apparent distress; mood and affect are within normal limits.    A skin examination was performed including: Face and neck. All findings within normal limits unless otherwise noted below.    Assessment/Plan   1. Acne vulgaris  Head - Anterior (Face)  Scattered on the patient's face, there are several open and closed comedones; a few erythematous,  inflammatory papules, mainly on her bilateral cheeks; and several hyperpigmented macules consistent with post-inflammatory hyperpigmentation    Acne Vulgaris -flare on face since running out of topical medications; mild-moderate; mixed comedonal and inflammatory types.  The nature of this condition and treatment options were discussed extensively with the patient today.  At this time, I recommend combination topical therapy with Benzoyl Peroxide 5% creamy wash once daily in the morning; Clindamycin 1% lotion twice daily or up to 3-4 times per day as needed for active lesions; and Tretinoin 0.1% cream at night.  The risks, benefits, and side effects of these medications, including the redness, dryness, and irritation expected with BP and Tretinoin use, were discussed; the patient was instructed to begin use of Tretinoin 1-2 nights per week and increase to every other night, then every night as tolerated without excessive redness or irritation.  I also informed the patient it will likely take at least 2-3 months to notice any significant improvement with the treatment regimen outlined above, and she was instructed to discontinue use of these medications should she become or attempt to become pregnant at any time in the future.  She expressed understanding and is in agreement with this plan.    benzoyl peroxide 5 % external wash - Head - Anterior (Face)  Apply 1 Application topically once daily. In the morning    clindamycin (Cleocin T) 1 % lotion - Head - Anterior (Face)  Apply topically 2 times a day.    tretinoin (Retin-A) 0.1 % cream - Head - Anterior (Face)  Apply 1 Application topically once daily at bedtime. Start 1-2 nights per week 1-2 weeks, inc to every other night, then every night as tolerated    2. Skin tag  Head - Anterior (Face)  On the patient's right lower eyelid, there is a 3 mm erythematous and flesh-colored, soft, fleshy, pedunculated papule with a surrounding rim of erythema    Inflamed  Acrochordon -right lower eyelid.  The benign nature of this skin tag was discussed with the patient today and reassurance provided.  Given the history the patient provides of frequent irritation and associated symptoms as well as its inflamed appearance on exam today, I offered to treat this lesion with liquid nitrogen cryotherapy.  After discussing the risks, benefits, and side effects of cryotherapy, including possible permanent hypo- and/or hyperpigmentation and/or scarring, the patient expressed understanding and wishes to proceed with cryotherapy today.    Destr of lesion - Head - Anterior (Face)  Complexity: simple    Destruction method: cryotherapy    Informed consent: discussed and consent obtained    Lesion destroyed using liquid nitrogen: Yes    Cryotherapy cycles:  2  Outcome: patient tolerated procedure well with no complications    Post-procedure details: wound care instructions given      3. Diffuse photodamage of skin  Photodistributed  Diffuse photodamage with actinic changes with telangiectasia and mottled pigmentation in sun-exposed areas.    Photodamage.  The signs and symptoms of skin cancer were reviewed and the patient was advised to practice sun protection and sun avoidance, use daily sunscreen, and perform regular self skin exams.  Sun protection was discussed, including avoiding the mid-day sun, wearing a sunscreen with SPF at least 50, and stressing the need for reapplication of sunscreen and applying more than they think they need.

## 2024-08-08 ENCOUNTER — APPOINTMENT (OUTPATIENT)
Dept: DERMATOLOGY | Facility: CLINIC | Age: 49
End: 2024-08-08
Payer: COMMERCIAL

## 2024-08-08 DIAGNOSIS — L70.0 ACNE VULGARIS: Primary | ICD-10-CM

## 2024-08-08 DIAGNOSIS — L57.8 DIFFUSE PHOTODAMAGE OF SKIN: ICD-10-CM

## 2024-08-08 DIAGNOSIS — L91.8 SKIN TAG: ICD-10-CM

## 2024-08-08 PROCEDURE — 99214 OFFICE O/P EST MOD 30 MIN: CPT | Performed by: DERMATOLOGY

## 2024-08-08 PROCEDURE — 17110 DESTRUCTION B9 LES UP TO 14: CPT | Performed by: DERMATOLOGY

## 2024-08-08 RX ORDER — BENZOYL PEROXIDE 50 MG/ML
1 LIQUID TOPICAL DAILY
Qty: 227 G | Refills: 11 | Status: SHIPPED | OUTPATIENT
Start: 2024-08-08

## 2024-08-08 RX ORDER — CLINDAMYCIN PHOSPHATE 10 UG/ML
LOTION TOPICAL 2 TIMES DAILY
Qty: 60 ML | Refills: 11 | Status: SHIPPED | OUTPATIENT
Start: 2024-08-08 | End: 2025-08-08

## 2024-08-08 RX ORDER — TRETINOIN 1 MG/G
1 CREAM TOPICAL NIGHTLY
Qty: 45 G | Refills: 11 | Status: SHIPPED | OUTPATIENT
Start: 2024-08-08

## 2024-08-08 ASSESSMENT — DERMATOLOGY PATIENT ASSESSMENT
DO YOU USE A TANNING BED: NO
DO YOU USE SUNSCREEN: DAILY
ARE YOU TRYING TO GET PREGNANT: NO
DO YOU HAVE ANY NEW OR CHANGING LESIONS: NO
DO YOU HAVE IRREGULAR MENSTRUAL CYCLES: NO
ARE YOU ON BIRTH CONTROL: NO

## 2024-08-08 ASSESSMENT — DERMATOLOGY QUALITY OF LIFE (QOL) ASSESSMENT: ARE THERE EXCLUSIONS OR EXCEPTIONS FOR THE QUALITY OF LIFE ASSESSMENT: NO

## 2024-08-09 ENCOUNTER — TELEPHONE (OUTPATIENT)
Dept: DERMATOLOGY | Facility: CLINIC | Age: 49
End: 2024-08-09
Payer: COMMERCIAL

## 2024-08-21 DIAGNOSIS — J30.2 SEASONAL ALLERGIES: Primary | ICD-10-CM

## 2024-08-21 PROCEDURE — RXMED WILLOW AMBULATORY MEDICATION CHARGE

## 2024-08-21 RX ORDER — FLUTICASONE PROPIONATE 50 MCG
1 SPRAY, SUSPENSION (ML) NASAL 2 TIMES DAILY
Qty: 16 G | Refills: 5 | Status: SHIPPED | OUTPATIENT
Start: 2024-08-21

## 2024-08-23 ENCOUNTER — PHARMACY VISIT (OUTPATIENT)
Dept: PHARMACY | Facility: CLINIC | Age: 49
End: 2024-08-23
Payer: MEDICAID

## 2024-09-09 PROCEDURE — RXMED WILLOW AMBULATORY MEDICATION CHARGE

## 2024-09-12 ENCOUNTER — PHARMACY VISIT (OUTPATIENT)
Dept: PHARMACY | Facility: CLINIC | Age: 49
End: 2024-09-12
Payer: MEDICAID

## 2024-09-15 PROCEDURE — RXMED WILLOW AMBULATORY MEDICATION CHARGE

## 2024-09-18 ENCOUNTER — PHARMACY VISIT (OUTPATIENT)
Dept: PHARMACY | Facility: CLINIC | Age: 49
End: 2024-09-18
Payer: MEDICAID

## 2024-09-20 ENCOUNTER — TELEPHONE (OUTPATIENT)
Dept: OBSTETRICS AND GYNECOLOGY | Facility: HOSPITAL | Age: 49
End: 2024-09-20
Payer: COMMERCIAL

## 2024-09-27 ENCOUNTER — APPOINTMENT (OUTPATIENT)
Dept: PRIMARY CARE | Facility: CLINIC | Age: 49
End: 2024-09-27
Payer: COMMERCIAL

## 2024-09-30 ENCOUNTER — APPOINTMENT (OUTPATIENT)
Dept: OBSTETRICS AND GYNECOLOGY | Facility: CLINIC | Age: 49
End: 2024-09-30
Payer: COMMERCIAL

## 2024-09-30 VITALS — DIASTOLIC BLOOD PRESSURE: 62 MMHG | SYSTOLIC BLOOD PRESSURE: 110 MMHG

## 2024-09-30 DIAGNOSIS — Z11.3 ROUTINE SCREENING FOR STI (SEXUALLY TRANSMITTED INFECTION): ICD-10-CM

## 2024-09-30 DIAGNOSIS — N89.8 VAGINAL DISCHARGE: ICD-10-CM

## 2024-09-30 DIAGNOSIS — N89.8 VAGINAL ODOR: Primary | ICD-10-CM

## 2024-09-30 PROCEDURE — 1036F TOBACCO NON-USER: CPT | Performed by: OBSTETRICS & GYNECOLOGY

## 2024-09-30 PROCEDURE — 87491 CHLMYD TRACH DNA AMP PROBE: CPT

## 2024-09-30 PROCEDURE — 87205 SMEAR GRAM STAIN: CPT

## 2024-09-30 PROCEDURE — 87591 N.GONORRHOEAE DNA AMP PROB: CPT

## 2024-09-30 PROCEDURE — 87661 TRICHOMONAS VAGINALIS AMPLIF: CPT

## 2024-09-30 PROCEDURE — 99213 OFFICE O/P EST LOW 20 MIN: CPT | Performed by: OBSTETRICS & GYNECOLOGY

## 2024-09-30 ASSESSMENT — PAIN SCALES - GENERAL: PAINLEVEL: 0-NO PAIN

## 2024-09-30 NOTE — PROGRESS NOTES
Assessment     PLAN  1. Vaginal odor  - treat based on test results  - Vaginitis Gram Stain For Bacterial Vaginosis + Yeast    2. Routine screening for STI (sexually transmitted infection)  - Syphilis Screen with Reflex; Future  - HIV 1/2 Antigen/Antibody Screen with Reflex to Confirmation; Future  - Hepatitis B surface Ag; Future  - Trichomonas vaginalis, Amplified  - C. trachomatis / N. gonorrhoeae, Amplified    Please return for your next preventative visit or sooner PRN    Yuliana Osborne MD      Subjective     Deirdre Zapataels is a 48 y.o. female who is here for vaginal odor  PCP = HANNAH Cha-CNP     Concerns:   - yeast on vaginitis swab in 5/2024  - c/o vaginal odor, minimal discharge, no itching   - desires STI testing    PMH, PSH, FH, SH, medications and allergies reviewed and edited as needed.      Objective   /62   LMP 09/11/2021      General:   Alert and oriented, in no acute distress               Vulva: Normal architecture without erythema, masses, or lesions.    Vagina: Normal mucosa without lesions, masses, or atrophy. No abnormal vaginal discharge.    cervix Surgically absent               Psych Normal affect. Normal mood.

## 2024-10-01 LAB
C TRACH RRNA SPEC QL NAA+PROBE: NEGATIVE
N GONORRHOEA DNA SPEC QL PROBE+SIG AMP: NEGATIVE
T VAGINALIS RRNA SPEC QL NAA+PROBE: NEGATIVE

## 2024-10-03 LAB
CLUE CELLS VAG LPF-#/AREA: ABNORMAL /[LPF]
NUGENT SCORE: 2
YEAST VAG WET PREP-#/AREA: PRESENT

## 2024-10-04 DIAGNOSIS — B37.9 YEAST INFECTION: Primary | ICD-10-CM

## 2024-10-04 RX ORDER — FLUCONAZOLE 150 MG/1
150 TABLET ORAL ONCE
Qty: 2 TABLET | Refills: 0 | Status: SHIPPED | OUTPATIENT
Start: 2024-10-04 | End: 2024-10-04

## 2024-10-05 PROCEDURE — RXMED WILLOW AMBULATORY MEDICATION CHARGE

## 2024-10-07 ENCOUNTER — TELEMEDICINE (OUTPATIENT)
Dept: PRIMARY CARE | Facility: CLINIC | Age: 49
End: 2024-10-07
Payer: COMMERCIAL

## 2024-10-07 DIAGNOSIS — Z98.84 HISTORY OF ROUX-EN-Y GASTRIC BYPASS: Primary | ICD-10-CM

## 2024-10-07 DIAGNOSIS — K59.09 CHRONIC CONSTIPATION: ICD-10-CM

## 2024-10-07 DIAGNOSIS — R15.9 INCONTINENCE OF FECES, UNSPECIFIED FECAL INCONTINENCE TYPE: ICD-10-CM

## 2024-10-07 PROCEDURE — RXMED WILLOW AMBULATORY MEDICATION CHARGE

## 2024-10-07 PROCEDURE — 99213 OFFICE O/P EST LOW 20 MIN: CPT

## 2024-10-07 PROCEDURE — 1036F TOBACCO NON-USER: CPT

## 2024-10-07 RX ORDER — LORATADINE 10 MG/1
10 TABLET ORAL DAILY
COMMUNITY

## 2024-10-07 RX ORDER — POLYETHYLENE GLYCOL 3350 17 G/17G
17 POWDER, FOR SOLUTION ORAL DAILY
Qty: 90 PACKET | Refills: 0 | Status: SHIPPED | OUTPATIENT
Start: 2024-10-07 | End: 2025-01-05

## 2024-10-07 NOTE — PROGRESS NOTES
Subjective   Patient ID: Deirdre Rodriguez is a 48 y.o. female who presents via virtual visit for GI Problem.  An interactive audio and video telecommunication system which permits real time communications between the patient (at the originating site) and provider (at the distant site) was utilized to provide this telehealth service.    Verbal consent was requested and obtained from the patient on the day of encounter.  GI Problem      Patient is a 48 year old AA female with PMH of right hip replacement, severe allergic asthma, insomnia, presents for follow up.      Asthma: Dulera, singulair, albuterol PRN. Sees pulm at [facility], doing well.   Insomnia: trazodone --> hasn't been needing it, sleeping without it.   Allergic rhinitis: claritin, flonase, omalizumab injection  HSV: valacyclovir 500mg daily    Reports since 2019 she has been having issues with constipation.   Has a bowel movement once every other day. Reports that they are hard. No blood. No abdominal pain.   Reports that about 6 months ago began having more frequent episodes of a small, pellet stool incontinence. Occurring approx once every other day, usually while walking or exercising.   No issues with urinary incontinence.     All systems have been reviewed and are negative for complaint other than those mentioned in the HPI.     Objective   Physical Exam  Constitutional:       Appearance: Normal appearance.   Pulmonary:      Effort: Pulmonary effort is normal.   Neurological:      General: No focal deficit present.      Mental Status: She is alert and oriented to person, place, and time.   Psychiatric:         Mood and Affect: Mood normal.         Behavior: Behavior normal.      Deirdre was seen today for gi problem.  Diagnoses and all orders for this visit:  History of Rosy-en-Y gastric bypass (Primary)  Chronic constipation  Incontinence of feces, unspecified fecal incontinence type  -    Chronic constipation, now with some incontinence  - Recommend  bowel regimen to help correct constipation. Rx for miralax sent  - Referral to GI given degree of constipation  - Referral to Gastroenterology; Future  -     polyethylene glycol (Glycolax, Miralax) 17 gram packet; Take 17 g by mouth once daily into 8 ounces of fluid.  -  Referral to Gastroenterology; Future

## 2024-10-08 ENCOUNTER — PHARMACY VISIT (OUTPATIENT)
Dept: PHARMACY | Facility: CLINIC | Age: 49
End: 2024-10-08
Payer: MEDICAID

## 2024-10-15 PROCEDURE — RXMED WILLOW AMBULATORY MEDICATION CHARGE

## 2024-10-17 ENCOUNTER — PHARMACY VISIT (OUTPATIENT)
Dept: PHARMACY | Facility: CLINIC | Age: 49
End: 2024-10-17
Payer: MEDICAID

## 2024-11-04 PROCEDURE — RXMED WILLOW AMBULATORY MEDICATION CHARGE

## 2024-11-06 ENCOUNTER — PHARMACY VISIT (OUTPATIENT)
Dept: PHARMACY | Facility: CLINIC | Age: 49
End: 2024-11-06
Payer: MEDICAID

## 2024-11-14 PROCEDURE — RXMED WILLOW AMBULATORY MEDICATION CHARGE

## 2024-11-15 ENCOUNTER — PHARMACY VISIT (OUTPATIENT)
Dept: PHARMACY | Facility: CLINIC | Age: 49
End: 2024-11-15
Payer: MEDICAID

## 2024-12-04 ENCOUNTER — APPOINTMENT (OUTPATIENT)
Dept: GASTROENTEROLOGY | Facility: CLINIC | Age: 49
End: 2024-12-04
Payer: COMMERCIAL

## 2024-12-04 PROCEDURE — RXMED WILLOW AMBULATORY MEDICATION CHARGE

## 2024-12-05 ENCOUNTER — PHARMACY VISIT (OUTPATIENT)
Dept: PHARMACY | Facility: CLINIC | Age: 49
End: 2024-12-05
Payer: MEDICAID

## 2024-12-10 PROCEDURE — RXMED WILLOW AMBULATORY MEDICATION CHARGE

## 2024-12-12 ENCOUNTER — PHARMACY VISIT (OUTPATIENT)
Dept: PHARMACY | Facility: CLINIC | Age: 49
End: 2024-12-12
Payer: MEDICAID

## 2024-12-30 PROCEDURE — RXMED WILLOW AMBULATORY MEDICATION CHARGE

## 2025-01-03 ENCOUNTER — PHARMACY VISIT (OUTPATIENT)
Dept: PHARMACY | Facility: CLINIC | Age: 50
End: 2025-01-03
Payer: MEDICAID

## 2025-01-04 PROCEDURE — RXMED WILLOW AMBULATORY MEDICATION CHARGE

## 2025-01-08 ENCOUNTER — PHARMACY VISIT (OUTPATIENT)
Dept: PHARMACY | Facility: CLINIC | Age: 50
End: 2025-01-08
Payer: MEDICAID

## 2025-01-08 DIAGNOSIS — B00.9 HSV (HERPES SIMPLEX VIRUS) INFECTION: ICD-10-CM

## 2025-01-08 PROCEDURE — RXMED WILLOW AMBULATORY MEDICATION CHARGE

## 2025-01-09 ENCOUNTER — PHARMACY VISIT (OUTPATIENT)
Dept: PHARMACY | Facility: CLINIC | Age: 50
End: 2025-01-09
Payer: MEDICAID

## 2025-01-10 PROCEDURE — RXMED WILLOW AMBULATORY MEDICATION CHARGE

## 2025-01-10 RX ORDER — VALACYCLOVIR HYDROCHLORIDE 500 MG/1
500 TABLET, FILM COATED ORAL DAILY
Qty: 90 TABLET | Refills: 1 | Status: SHIPPED | OUTPATIENT
Start: 2025-01-10 | End: 2025-07-09

## 2025-01-13 ENCOUNTER — PHARMACY VISIT (OUTPATIENT)
Dept: PHARMACY | Facility: CLINIC | Age: 50
End: 2025-01-13
Payer: MEDICAID

## 2025-01-13 PROCEDURE — RXMED WILLOW AMBULATORY MEDICATION CHARGE

## 2025-01-13 NOTE — PROGRESS NOTES
Subjective   Patient ID: Deirdre Rodriguez is a 49 y.o. female who presents for fish bone stuck in throat.  HPI  This 49-year-old female is being seen in the office today for an evaluation of possible foreign body in the pharynx i.e. fishbone.  There are no records regarding this being checked more recently by her PCP or by urgent care.  The patient states that this occurred about 48 hours ago.  Facial bones were firm.  Slowly over time she has noted some improvement and she has had no difficulties with salivation, difficulties with eating other foods or drinks.  There has been no bleeding.  No neck pain or swelling has occurred.  Review of Systems  A 12 point ROS has been reviewed and are negative for complaint except what is stated in the history of present illness and/or past medical history as noted in the EMR    Past Medical History:   Diagnosis Date    Abnormal weight loss 05/06/2021    Rapid weight loss    Abnormal weight loss 06/08/2020    Rapid weight loss    Acute vaginitis 05/15/2020    Acute vaginitis    Allergy status to unspecified drugs, medicaments and biological substances     History of seasonal allergies    Anorexia 09/10/2021    Poor appetite    Body mass index (BMI)40.0-44.9, adult 11/04/2019    Body mass index (BMI) of 40.0 to 44.9 in adult    Encounter for gynecological examination (general) (routine) without abnormal findings 07/12/2019    Pap smear, as part of routine gynecological examination    Encounter for gynecological examination (general) (routine) without abnormal findings 05/24/2021    Encounter for annual routine gynecological examination    Encounter for gynecological examination (general) (routine) without abnormal findings     Women's annual routine gynecological examination    Encounter for immunization 09/21/2022    Encounter for administration of vaccine    Encounter for other preprocedural examination 06/28/2019    Pre-operative clearance    Encounter for other specified  surgical aftercare 05/13/2022    Encounter for postoperative care    Encounter for pregnancy test, result negative     Urine pregnancy test negative    Encounter for screening for malignant neoplasm of colon 09/10/2021    Screening for colorectal cancer    Encounter for screening for other suspected endocrine disorder 06/08/2020    Screening for endocrine, nutritional, metabolic and immunity disorder    Encounter for screening for other suspected endocrine disorder 06/08/2020    Screening for endocrine, nutritional, metabolic and immunity disorder    Encounter for screening for other viral diseases 07/26/2021    Encounter for hepatitis C screening test for low risk patient    Flushing 05/25/2021    Flushing    Gluteal tendinitis, left hip 06/28/2021    Gluteal tendinitis of left buttock    Mastodynia 05/07/2014    Breast pain    Menopausal and female climacteric states 05/07/2014    Hot flash, menopausal    Morbid (severe) obesity due to excess calories ([facility]) 08/01/2019    Psychological factors affecting morbid obesity    Nausea 09/10/2021    Nausea in adult    Other acute postprocedural pain     Acute post-operative pain    Other amnesia 06/03/2015    Memory difficulty    Other symptoms and signs involving the musculoskeletal system 06/27/2019    Weakness of left lower extremity    Overweight 06/08/2020    Overweight and obesity    Pain in left ankle and joints of left foot 02/24/2020    Chronic pain of left ankle    Pain in unspecified ankle and joints of unspecified foot 06/27/2019    Ankle joint pain    Personal history of (healed) traumatic fracture 06/26/2019    History of fracture of left ankle    Personal history of other diseases of the musculoskeletal system and connective tissue 09/23/2019    History of low back pain    Personal history of other diseases of the nervous system and sense organs 10/14/2019    History of obstructive sleep apnea    Personal history of other infectious and parasitic diseases      History of candidiasis of vagina    Personal history of other infectious and parasitic diseases 07/28/2022    History of herpes genitalis    Personal history of other medical treatment 05/06/2021    History of screening mammography    Personal history of other medical treatment 11/08/2021    History of screening mammography    Personal history of other specified conditions     History of postoperative nausea and vomiting    Unspecified asthma, uncomplicated ([facility]) 09/21/2022    Asthma          Current Outpatient Medications:     acetaminophen (Tylenol) 500 mg tablet, Take 1 tablet (500 mg) by mouth every 4 hours if needed for mild pain (1 - 3)., Disp: 30 tablet, Rfl: 2    albuterol 90 mcg/actuation inhaler, INHALE TWO PUFFS BY MOUTH EVERY 4 HOURS AS NEEDED AS INSTRUCTED, Disp: , Rfl:     albuterol 90 mcg/actuation inhaler, Inhale 2 puffs every 4 hours if needed., Disp: 6.7 g, Rfl: 4    albuterol 90 mcg/actuation inhaler, Inhale 2 puffs every 4 hours if needed as instructed., Disp: 18 g, Rfl: 5    amoxicillin (Amoxil) 500 mg capsule, Take 4 Capsules 1 Hour Prior to Dental Procedure or Cleaning., Disp: 4 capsule, Rfl: 6    benzoyl peroxide 5 % external wash, Apply 1 Application topically once daily. In the morning, Disp: 237 g, Rfl: 11    clindamycin (Cleocin T) 1 % lotion, Apply topically 2 times a day., Disp: 60 mL, Rfl: 11    EPINEPHrine (EpiPen 2-Mason) 0.3 mg/0.3 mL injection syringe, Inject 0.3 ml into the muscle as needed, Disp: 2 each, Rfl: 3    EPINEPHrine 0.3 mg/0.3 mL injection syringe, INJECT 0.3 ML INTRAMUSCULARLY AS NEEDED., Disp: , Rfl:     famotidine-Ca carb-mag hydrox (Pepcid Complete) -165 mg chewable tablet, 480899 Medication loratadine 10 mg tablet Allergy Relief (loratadine) 10 mg tablet 10 mg 10/23/2020 Active (Outside), Disp: , Rfl:     ferrous sulfate, 325 mg ferrous sulfate, (Iron, ferrous sulfate,) tablet, Take 1 tablet (325 mg) by mouth once daily., Disp: 90 tablet, Rfl: 1     "fluticasone (Flonase) 50 mcg/actuation nasal spray, Administer 1 spray into each nostril 2 times a day., Disp: 16 g, Rfl: 5    loratadine (Claritin) 10 mg tablet, Take 1 tablet (10 mg) by mouth once daily., Disp: , Rfl:     melatonin 3 mg tablet, Take 1 tablet (3 mg) by mouth once daily at bedtime., Disp: 30 tablet, Rfl: 5    mometasone-formoterol (Dulera) 200-5 mcg/actuation inhaler, Inhale 2 Puffs into the lungs Twice a day 30 days, Disp: 13 g, Rfl: 4    montelukast (Singulair) 10 mg tablet, Take 1 tablet (10 mg) by mouth once daily at bedtime., Disp: 30 tablet, Rfl: 3    montelukast (Singulair) 10 mg tablet, Take 1 tablet Orally Once a day 30 days, Disp: 30 tablet, Rfl: 4    traZODone (Desyrel) 50 mg tablet, Take 1 tablet (50 mg) by mouth as needed at bedtime for sleep., Disp: 90 tablet, Rfl: 0    tretinoin (Retin-A) 0.1 % cream, Apply 1 Application topically once daily at bedtime. Start 1-2 nights per week 1-2 weeks, then increase to every other night, then every night as tolerated, Disp: 45 g, Rfl: 11    valACYclovir (Valtrex) 500 mg tablet, Take 1 tablet (500 mg) by mouth once daily., Disp: 90 tablet, Rfl: 1    Vitamin B-12 5,000 mcg tablet, sublingual, DISSOLVE ONE TABLET UNDER TONGUE ONCE DAILY, Disp: 90 tablet, Rfl: 0     Past Surgical History:   Procedure Laterality Date    OTHER SURGICAL HISTORY  04/22/2014    Breast Surgery Enlargement Procedure    OTHER SURGICAL HISTORY  07/26/2021    Esophagogastroduodenoscopy    OTHER SURGICAL HISTORY  10/09/2019    Rosy-en-Y gastric bypass    OTHER SURGICAL HISTORY  12/26/2018    Open reduction-internal fixation      No family history on file.    Social History     Tobacco Use    Smoking status: Never    Smokeless tobacco: Never   Substance Use Topics    Alcohol use: Yes     Alcohol/week: 4.0 standard drinks of alcohol     Types: 4 Glasses of wine per week       No Known Allergies    Height 1.689 m (5' 6.5\"), weight 68 kg (150 lb), last menstrual period " 09/11/2021.    Objective   Physical Exam  Examination:    CONSTITUTIONAL: Alert, in no acute distress, normal pitch/clarity of voice, well-developed, well-nourished, cooperative.  HEAD/FACE: Normocephalic, atraumatic, no tenderness over the sinuses, facial strength and movement symmetric.    SKIN: Good turgor, no rashes, no suspicious lesions, in the head and neck.    EYES: Both eyes have normal extraocular movements with no nystagmus, pupils are equal and reactive to light and accommodation, conjunctiva is clear.    EARS: Both ears are negative for external skin abnormalities, external auditory canals are without lesions or signs of inflammation, tympanic membranes are intact and are of normal color and texture, no effusions are seen, light reflexes normal, no mastoid tenderness is noted to palpation, objective hearing is intact.    NOSE: No external skin lesions are noted, nares are patent, septum is intact and noninflamed, nasal turbinates are normal in appearance, sinuses are nontender to palpation bilaterally, no internal lesions or polyps are noted, no discharge is noted.    OROPHARYNX/ORAL CAVITY: Mucous membranes of the oropharynx and the oral cavity proper are without lesions or ulcerations, tongue mobility is normal and no lesions are noted, gingiva and alveolar mucosa is intact without lesions, oral mucosa is moist, muscular movement of the palate and gag reflex are normal.    NASOPHARYNX: Mucous membranes are noninflamed and no secretions or lesions are noted.  As per fiberoptic    LARYNX: See procedure note    NECK: No lymphadenopathy is palpated, neck is supple with full range of motion, thyroid is without swelling or tenderness, trachea is midline, no neck masses are noted.  No crepitus was normal, no thyroid enlargement was noted    Lymphatics: No cervical adenopathy or supraclavicular adenopathy noted to palpation.    HEART/VASCULAR: No jugular venous distention is noted, carotid pulsations are  intact with a regular rate and rhythm noted,    PULMONARY: Good air movement with normal inspiratory/expiratory effort is noted, no audible wheezing is appreciated.    NEUROLOGIC: Alert and oriented, cranial nerves are grossly intact, gait is normal, sensation in the head and neck is intact,    PSYCH: oriented to person, place and time, normal mood and affect.    EXTREMITIES: No motor dysfunction of the upper and lower extremity is noted.    Patient ID: Deirdre Rodriguez is a 49 y.o. female.    Laryngoscopy    Date/Time: 1/14/2025 11:26 AM    Performed by: Omer Wood DMD, MD  Authorized by: Omer Wood DMD, MD    Consent:     Consent obtained:  Verbal    Consent given by:  Patient    Risks discussed:  Pain    Alternatives discussed:  No treatment and referral  Procedure details:     Indications: direct visualization of the upper aerodigestive tract      Indications comment:  Foreign body hypopharynx  Post-procedure details:     Patient tolerance of procedure:  Tolerated well, no immediate complications  Comments:      LARYNGOSCOPY    Indications: Foreign body hypopharynx, throat pain    Consent:  The procedure was discussed including the possible risks and benefits as well as alternative treatments with the patient. Verbal consent was obtained.    Procedure:  Topical Taco-Synephrine and 4% lidocaine  was applied as a decongestant and anesthetic nasally. A fiberoptic laryngoscope is inserted nasally and the upper aerodigestive tract is examined.    Findings: Fiberoptic examination was done of the upper aerodigestive tract starting on the right nasal passage.  There were no intranasal abnormalities identified.  The nasopharynx was patent with no lesions or signs of inflammation or redness.  Base of the tongue was symmetric with no evidence for foreign body or swelling.  There is no vallecular abnormalities noted.  The lateral walls of the hypopharynx and oropharynx were negative for erythema or  inflammation.  With phonation on the left side on the posterior wall of the cricoid there was a surface abrasion with no identified foreign body seen with it.  There was no surrounding bulging or inflammation.  There was no pooling of secretions in the area.    Post procedure:  The patient tolerated the procedure well without complications.    Assessment/Plan   Problem List Items Addressed This Visit             ICD-10-CM    Asthma J45.909    Fibromyalgia M79.7    History of Rosy-en-Y gastric bypass Z98.84     Other Visit Diagnoses         Codes    Foreign body in pharynx, initial encounter    -  Primary T17.208A    Abrasion of throat, initial encounter     S10.11XA          I discussed the clinical finds with the patient.  In the area that she questions if there is a superficial abrasion on the posterior aspect of the cricoid cartilage on the left side.  There did not appear to be an associated foreign body with that that I could detect.  There was no bulging or swelling or inflammation around that site.  This would correspond to where she has felt the discomfort that has been improving over the past 48 hours.  As such the abrasion itself should heal although I did tell that she should gargle to help cleanse the area and she should avoid abrasive foods.  If for some reason symptoms are increasing and radiographic studies would become needed.  She is going to be seen in 1 week to recheck the area but if she is improved and is having no symptoms over the next few days that can be canceled.  I answered her questions in detail and made her aware of the anatomy of the area and the things to call about if symptoms are increasing.       Omer Wood DMD, MD 01/14/25 11:26 AM

## 2025-01-14 ENCOUNTER — OFFICE VISIT (OUTPATIENT)
Dept: OTOLARYNGOLOGY | Facility: CLINIC | Age: 50
End: 2025-01-14
Payer: COMMERCIAL

## 2025-01-14 VITALS — HEIGHT: 67 IN | WEIGHT: 150 LBS | BODY MASS INDEX: 23.54 KG/M2

## 2025-01-14 DIAGNOSIS — Z98.84 HISTORY OF ROUX-EN-Y GASTRIC BYPASS: ICD-10-CM

## 2025-01-14 DIAGNOSIS — M79.7 FIBROMYALGIA: ICD-10-CM

## 2025-01-14 DIAGNOSIS — S10.11XA ABRASION OF THROAT, INITIAL ENCOUNTER: ICD-10-CM

## 2025-01-14 DIAGNOSIS — T17.208A FOREIGN BODY IN PHARYNX, INITIAL ENCOUNTER: Primary | ICD-10-CM

## 2025-01-14 DIAGNOSIS — J45.40 MODERATE PERSISTENT ASTHMA WITHOUT COMPLICATION (HHS-HCC): ICD-10-CM

## 2025-01-14 PROBLEM — M54.50 LOW BACK PAIN, UNSPECIFIED: Status: ACTIVE | Noted: 2022-11-11

## 2025-01-14 PROBLEM — E66.3 OVERWEIGHT: Status: ACTIVE | Noted: 2025-01-14

## 2025-01-14 PROBLEM — E66.01 MORBID OBESITY (MULTI): Status: ACTIVE | Noted: 2025-01-14

## 2025-01-14 PROBLEM — E66.9 OBESITY WITH BODY MASS INDEX 30 OR GREATER: Status: ACTIVE | Noted: 2025-01-14

## 2025-01-14 PROBLEM — J06.9 VIRAL UPPER RESPIRATORY TRACT INFECTION: Status: ACTIVE | Noted: 2025-01-14

## 2025-01-14 PROBLEM — G47.33 OBSTRUCTIVE SLEEP APNEA SYNDROME: Status: ACTIVE | Noted: 2025-01-14

## 2025-01-14 PROCEDURE — 31575 DIAGNOSTIC LARYNGOSCOPY: CPT | Performed by: OTOLARYNGOLOGY

## 2025-01-14 PROCEDURE — 99203 OFFICE O/P NEW LOW 30 MIN: CPT | Performed by: OTOLARYNGOLOGY

## 2025-01-14 PROCEDURE — 1036F TOBACCO NON-USER: CPT | Performed by: OTOLARYNGOLOGY

## 2025-01-14 PROCEDURE — 3008F BODY MASS INDEX DOCD: CPT | Performed by: OTOLARYNGOLOGY

## 2025-01-17 ENCOUNTER — PHARMACY VISIT (OUTPATIENT)
Dept: PHARMACY | Facility: CLINIC | Age: 50
End: 2025-01-17
Payer: MEDICAID

## 2025-01-21 ENCOUNTER — APPOINTMENT (OUTPATIENT)
Dept: OTOLARYNGOLOGY | Facility: CLINIC | Age: 50
End: 2025-01-21
Payer: COMMERCIAL

## 2025-01-21 DIAGNOSIS — T17.208A FOREIGN BODY IN PHARYNX, INITIAL ENCOUNTER: Primary | ICD-10-CM

## 2025-01-21 DIAGNOSIS — S10.11XA ABRASION OF THROAT, INITIAL ENCOUNTER: ICD-10-CM

## 2025-01-21 DIAGNOSIS — J45.40 MODERATE PERSISTENT ASTHMA WITHOUT COMPLICATION (HHS-HCC): ICD-10-CM

## 2025-01-28 PROCEDURE — RXMED WILLOW AMBULATORY MEDICATION CHARGE

## 2025-01-29 ENCOUNTER — PHARMACY VISIT (OUTPATIENT)
Dept: PHARMACY | Facility: CLINIC | Age: 50
End: 2025-01-29
Payer: MEDICAID

## 2025-02-03 PROCEDURE — RXMED WILLOW AMBULATORY MEDICATION CHARGE

## 2025-02-06 ENCOUNTER — PHARMACY VISIT (OUTPATIENT)
Dept: PHARMACY | Facility: CLINIC | Age: 50
End: 2025-02-06
Payer: MEDICAID

## 2025-02-06 PROCEDURE — RXMED WILLOW AMBULATORY MEDICATION CHARGE

## 2025-02-10 ENCOUNTER — PHARMACY VISIT (OUTPATIENT)
Dept: PHARMACY | Facility: CLINIC | Age: 50
End: 2025-02-10
Payer: MEDICAID

## 2025-02-20 PROCEDURE — RXMED WILLOW AMBULATORY MEDICATION CHARGE

## 2025-02-21 ENCOUNTER — PHARMACY VISIT (OUTPATIENT)
Dept: PHARMACY | Facility: CLINIC | Age: 50
End: 2025-02-21
Payer: MEDICAID

## 2025-02-26 ENCOUNTER — PHARMACY VISIT (OUTPATIENT)
Dept: PHARMACY | Facility: CLINIC | Age: 50
End: 2025-02-26
Payer: MEDICAID

## 2025-02-26 PROCEDURE — RXMED WILLOW AMBULATORY MEDICATION CHARGE

## 2025-02-28 ENCOUNTER — PHARMACY VISIT (OUTPATIENT)
Dept: PHARMACY | Facility: CLINIC | Age: 50
End: 2025-02-28

## 2025-03-05 PROCEDURE — RXMED WILLOW AMBULATORY MEDICATION CHARGE

## 2025-03-06 ENCOUNTER — PHARMACY VISIT (OUTPATIENT)
Dept: PHARMACY | Facility: CLINIC | Age: 50
End: 2025-03-06
Payer: MEDICAID

## 2025-03-18 ENCOUNTER — APPOINTMENT (OUTPATIENT)
Dept: PRIMARY CARE | Facility: CLINIC | Age: 50
End: 2025-03-18
Payer: COMMERCIAL

## 2025-03-18 VITALS
BODY MASS INDEX: 23.85 KG/M2 | DIASTOLIC BLOOD PRESSURE: 70 MMHG | OXYGEN SATURATION: 95 % | SYSTOLIC BLOOD PRESSURE: 115 MMHG | HEART RATE: 80 BPM | HEIGHT: 67 IN | TEMPERATURE: 98.1 F

## 2025-03-18 DIAGNOSIS — B00.9 HSV (HERPES SIMPLEX VIRUS) INFECTION: ICD-10-CM

## 2025-03-18 DIAGNOSIS — K59.00 CONSTIPATION, UNSPECIFIED CONSTIPATION TYPE: Primary | ICD-10-CM

## 2025-03-18 DIAGNOSIS — Z00.00 LABORATORY EXAMINATION ORDERED AS PART OF A ROUTINE GENERAL MEDICAL EXAMINATION: ICD-10-CM

## 2025-03-18 DIAGNOSIS — J30.2 SEASONAL ALLERGIES: ICD-10-CM

## 2025-03-18 DIAGNOSIS — Z98.84 HISTORY OF ROUX-EN-Y GASTRIC BYPASS: ICD-10-CM

## 2025-03-18 DIAGNOSIS — J45.40 MODERATE PERSISTENT ASTHMA WITHOUT COMPLICATION (HHS-HCC): ICD-10-CM

## 2025-03-18 DIAGNOSIS — Z76.89 ENCOUNTER TO ESTABLISH CARE WITH NEW DOCTOR: ICD-10-CM

## 2025-03-18 DIAGNOSIS — R52 PAIN: ICD-10-CM

## 2025-03-18 DIAGNOSIS — M16.11 PRIMARY LOCALIZED OSTEOARTHRITIS OF RIGHT HIP: ICD-10-CM

## 2025-03-18 DIAGNOSIS — Z23 ENCOUNTER FOR IMMUNIZATION: ICD-10-CM

## 2025-03-18 PROCEDURE — 1036F TOBACCO NON-USER: CPT | Performed by: STUDENT IN AN ORGANIZED HEALTH CARE EDUCATION/TRAINING PROGRAM

## 2025-03-18 PROCEDURE — 90471 IMMUNIZATION ADMIN: CPT | Performed by: STUDENT IN AN ORGANIZED HEALTH CARE EDUCATION/TRAINING PROGRAM

## 2025-03-18 PROCEDURE — 90677 PCV20 VACCINE IM: CPT | Performed by: STUDENT IN AN ORGANIZED HEALTH CARE EDUCATION/TRAINING PROGRAM

## 2025-03-18 PROCEDURE — RXMED WILLOW AMBULATORY MEDICATION CHARGE

## 2025-03-18 PROCEDURE — 99214 OFFICE O/P EST MOD 30 MIN: CPT | Performed by: STUDENT IN AN ORGANIZED HEALTH CARE EDUCATION/TRAINING PROGRAM

## 2025-03-18 RX ORDER — VALACYCLOVIR HYDROCHLORIDE 500 MG/1
500 TABLET, FILM COATED ORAL DAILY
Qty: 90 TABLET | Refills: 1 | Status: SHIPPED | OUTPATIENT
Start: 2025-03-18 | End: 2025-09-14

## 2025-03-18 RX ORDER — MONTELUKAST SODIUM 10 MG/1
10 TABLET ORAL NIGHTLY
Qty: 30 TABLET | Refills: 3 | Status: SHIPPED | OUTPATIENT
Start: 2025-03-18

## 2025-03-18 RX ORDER — POLYETHYLENE GLYCOL 3350 17 G/17G
17 POWDER, FOR SOLUTION ORAL DAILY
Qty: 30 PACKET | Refills: 11 | Status: SHIPPED | OUTPATIENT
Start: 2025-03-18

## 2025-03-18 RX ORDER — ACETAMINOPHEN 500 MG
500 TABLET ORAL EVERY 4 HOURS PRN
Qty: 60 TABLET | Refills: 3 | Status: SHIPPED | OUTPATIENT
Start: 2025-03-18

## 2025-03-18 RX ORDER — FLUTICASONE PROPIONATE 50 MCG
1 SPRAY, SUSPENSION (ML) NASAL 2 TIMES DAILY
Qty: 16 G | Refills: 5 | Status: SHIPPED | OUTPATIENT
Start: 2025-03-18

## 2025-03-18 RX ORDER — OMALIZUMAB 150 MG/ML
150 INJECTION, SOLUTION SUBCUTANEOUS
COMMUNITY
Start: 2024-06-24

## 2025-03-18 ASSESSMENT — ENCOUNTER SYMPTOMS
LOSS OF SENSATION IN FEET: 0
DEPRESSION: 0
OCCASIONAL FEELINGS OF UNSTEADINESS: 0

## 2025-03-18 NOTE — PROGRESS NOTES
[facility] - Primary Care  [address], [address]  [place] [address]    Subjective     Patient ID: Deirdre Rodriguez is a 49 y.o. female who presents for  [facility]     Valtrex for HSV suppression, doing well. No s/e. Outbreaks have been controlled.    Montelukast for asthma and allergies. Stable and doing well. No breathing issues.    Flonase also for allergies, helping.    Has hx of hip surgery, does tylenol for this. Is stable. Is working out and exercising.    Hx of maddy-en-y. Is taking supplements for vitamins.     Constipation issues. Is stable on miralax, takes as needed, sometimes needs daily for a while. Does tolerate well.    Seeing OBGYN, has appt in a few months with them.    Review of Systems   All other systems reviewed and are negative.      Social History     Tobacco Use    Smoking status: Never    Smokeless tobacco: Never   Vaping Use    Vaping status: Never Used   Substance Use Topics    Alcohol use: Yes     Alcohol/week: 4.0 standard drinks of alcohol     Types: 4 Glasses of wine per week     Comment: 1drink a month    Drug use: Never     No family history on file.  No Known Allergies     Current Outpatient Medications   Medication Sig Dispense Refill    albuterol 90 mcg/actuation inhaler Inhale 2 puffs every 4 hours if needed. 6.7 g 4    albuterol 90 mcg/actuation inhaler Inhale 2 puffs every 4 hours if needed as instructed. 18 g 5    benzoyl peroxide 5 % external wash Apply 1 Application topically once daily. In the morning 237 g 11    clindamycin (Cleocin T) 1 % lotion Apply topically 2 times a day. 60 mL 11    loratadine (Claritin) 10 mg tablet Take 1 tablet (10 mg) by mouth once daily.      melatonin 3 mg tablet Take 1 tablet (3 mg) by mouth once daily at bedtime. 30 tablet 5    mometasone-formoterol (Dulera) 200-5 mcg/actuation inhaler Inhale 2 Puffs into the lungs Twice a day 13 g 4    tretinoin (Retin-A) 0.1 % cream Apply 1 Application topically once daily at  "bedtime. Start 1-2 nights per week 1-2 weeks, then increase to every other night, then every night as tolerated 45 g 11    Vitamin B-12 5,000 mcg tablet, sublingual DISSOLVE ONE TABLET UNDER TONGUE ONCE DAILY 90 tablet 0    Xolair 150 mg/mL subcutaneous syringe Inject 1 mL (150 mg) under the skin every 14 (fourteen) days.      acetaminophen (Tylenol) 500 mg tablet Take 1 tablet (500 mg) by mouth every 4 hours if needed for mild pain (1 - 3). 60 tablet 3    EPINEPHrine 0.3 mg/0.3 mL injection syringe INJECT 0.3 ML INTRAMUSCULARLY AS NEEDED.      fluticasone (Flonase) 50 mcg/actuation nasal spray Administer 1 spray into each nostril 2 times a day. 16 g 5    montelukast (Singulair) 10 mg tablet Take 1 tablet (10 mg) by mouth once daily at bedtime. 30 tablet 3    polyethylene glycol (Glycolax, Miralax) 17 gram packet Take 17 g by mouth once daily. 30 packet 11    traZODone (Desyrel) 50 mg tablet Take 1 tablet (50 mg) by mouth as needed at bedtime for sleep. 90 tablet 0    valACYclovir (Valtrex) 500 mg tablet Take 1 tablet (500 mg) by mouth once daily. 90 tablet 1     No current facility-administered medications for this visit.       /70 (BP Location: Right arm, Patient Position: Sitting, BP Cuff Size: Adult)   Pulse 80   Temp 36.7 °C (98.1 °F) (Temporal)   Ht 1.689 m (5' 6.5\")   LMP 09/11/2021   SpO2 95%   BMI 23.85 kg/m²      Objective   Physical Exam  Vitals reviewed.   Constitutional:       Appearance: Normal appearance.   Eyes:      Extraocular Movements: Extraocular movements intact.      Pupils: Pupils are equal, round, and reactive to light.   Cardiovascular:      Rate and Rhythm: Normal rate and regular rhythm.      Pulses: Normal pulses.      Heart sounds: Normal heart sounds.   Pulmonary:      Effort: Pulmonary effort is normal.      Breath sounds: Normal breath sounds.   Abdominal:      General: Abdomen is flat. Bowel sounds are normal.      Palpations: Abdomen is soft.   Musculoskeletal:         " "General: Normal range of motion.      Cervical back: Normal range of motion and neck supple.   Neurological:      General: No focal deficit present.      Mental Status: She is alert.   Psychiatric:         Mood and Affect: Mood normal.         Behavior: Behavior normal.           Labs:   Lab Results   Component Value Date    WBC 4.5 02/15/2023    HGB 12.7 02/15/2023    HCT 40.2 02/15/2023     02/15/2023    TSH 1.37 03/13/2024    INR 1.1 11/02/2022     Lab Results   Component Value Date     03/13/2024    K 4.7 03/13/2024     03/13/2024    BUN 16 03/13/2024    CREATININE 0.84 03/13/2024    GLUCOSE 91 03/13/2024    CALCIUM 9.1 03/13/2024    PROT 6.3 (L) 03/13/2024    BILITOT 0.2 03/13/2024    ALKPHOS 55 03/13/2024    AST 23 03/13/2024    ALT 30 03/13/2024     Lab Results   Component Value Date    CHOL 112 06/15/2020    CHOL 148 03/29/2019      Lab Results   Component Value Date    TRIG 59 06/15/2020    TRIG 60 03/29/2019      Lab Results   Component Value Date    HDL 50.5 06/15/2020    HDL 45.2 03/29/2019     No results found for: \"LDLCALC\"   Lab Results   Component Value Date    VLDL 12 06/15/2020    VLDL 12 03/29/2019    No components found for: \"CHOLHDLRATI0\"    No data recorded    Imaging/Testing: XR DEXA bone density  Narrative: Interpreted By:  Michel Shen,   STUDY:  DEXA BONE DENSITY5/31/2024 2:03 pm      INDICATION:  Signs/Symptoms:History of multiple fractures, history of gastric  bypass surgery history.  The patient is a 47 y/o  year old F.      COMPARISON:  None.      ACCESSION NUMBER(S):  [id number]      ORDERING CLINICIAN:  NICOLE CHAVEZ      TECHNIQUE:  DEXA BONE DENSITY      FINDINGS:  SPINE L1-L4  Bone Mineral Density: Not reported  T-Score Not reported  Z-Score Not reported  Bone Mineral Density change vs baseline:  Not reported  Bone Mineral Density change vs previous: Not reported      LEFT FEMUR -TOTAL  Bone Mineral Density: Not reported  T-Score Not reported   Z-Score  Not " reported  Bone Mineral Density change vs baseline: Not reported  Bone Mineral Density change vs previous: Not reported      LEFT FEMUR -NECK  Bone Mineral Density: Not reported  T-Score Not reported  Z-Score Not reported  Bone Mineral Density change vs baseline: Not reported  Bone Mineral Density change vs previous: Not reported      LEFT FOREARM  Total  Bone Mineral Density: Not reported  T-Score Not reported  Z-Score Not reported  UD  Bone Mineral Density: Not reported  T-Score Not reported Z-Score Not reported  MID  Bone Mineral Density: Not reported  T-Score Not reported Z-Score Not reported  1/3  Bone Mineral Density: Not reported  T-Score Not reported   Z-Score Not reported  Bone Mineral Density change vs baseline:  Not reported  Bone Mineral Density change vs previous:  Not reported      RIGHT FEMUR -TOTAL  Bone Mineral Density: 1.193  T-Score 1.5   Z-Score 1.0  Bone Mineral Density change vs baseline:  Not reported  Bone Mineral Density change vs previous: Not reported      RIGHT FEMUR -NECK  Bone Mineral Density: 1.230  T-Score 1.4   Z-Score  1.2  Bone Mineral Density change vs baseline:  Bone Mineral Density change vs previous:      RIGHT FOREARM  Bone Mineral Density: Not reported  T-Score Not reported   Z-Score Not reported  UD  Bone Mineral Density: Not reported  T-Score Not reported  Z-Score Not reported  MID  Bone Mineral Density: Not reported  T-Score Not reported  Z-Score  Not reported  1/3  Bone Mineral Density: Not reported  T-Score Not reported  Z-Score Not reported  Bone Mineral Density change vs baseline:  Not reported  Bone Mineral Density change vs previous:  Not reported          World Health Organization (WHO) criteria for post-menopausal,   Women:  Normal:         T-score at or above -1 SD  Osteopenia:   T-score between -1 and -2.5 SD  Osteoporosis: T-score at or below -2.5 SD          10-year Fracture Risk:  Major Osteoporotic Fracture  2.5  Hip Fracture                         0.0      Note:  If no FRAX score is reported, it is because:  Some T-score for Spine Total or Hip Total or Femoral Neck at or below  -2.5          Impression: DEXA:  According to World Health Organization criteria,  classification is normal.      Followup recommended in 2 years or sooner as clinically warranted.      All images and detailed analysis are available on the  Radiology  PACS.      MACRO:  None      Signed by: Michel Shen 6/5/2024 2:33 PM  Dictation workstation:   [id number]    Assessment/Plan   Problem List Items Addressed This Visit       Asthma     chronic issue, stable, and initial encounter. Continue current management. Refill sent.         Relevant Medications    montelukast (Singulair) 10 mg tablet    Other Relevant Orders    Follow Up In Primary Care - Health Maintenance    History of Rosy-en-Y gastric bypass     chronic issue, stable, and initial encounter. Continue current management. Will recheck labs, nutritional labs yearly.         Relevant Orders    CBC and Auto Differential    Comprehensive Metabolic Panel    Lipid Panel    TSH with reflex to Free T4 if abnormal    Vitamin D 25-Hydroxy,Total (for eval of Vitamin D levels)    Vitamin B12    Primary localized osteoarthritis of right hip     chronic issue, stable, and initial encounter. Continue current management. Refill sent. Continue exercise.         Seasonal allergies     chronic issue, stable, and initial encounter. Continue current management. Refill sent.           Relevant Medications    fluticasone (Flonase) 50 mcg/actuation nasal spray    HSV (herpes simplex virus) infection     chronic issue, stable, and initial encounter. Continue current management. Refill sent. Check CBC and Cmp yearly.         Relevant Medications    valACYclovir (Valtrex) 500 mg tablet     Other Visit Diagnoses       Constipation, unspecified constipation type    -  Primary    Relevant Medications    polyethylene glycol (Glycolax, Miralax) 17 gram  packet    Pain        Relevant Medications    acetaminophen (Tylenol) 500 mg tablet    Encounter for immunization        Relevant Orders    Pneumococcal conjugate vaccine, 20-valent (PREVNAR 20) (Completed)    Encounter to establish care with new doctor        Laboratory examination ordered as part of a routine general medical examination              Plan to complete labs before physical    current treatment plan is effective, no change in therapy, orders and follow up as documented in EMR, lab results reviewed with patient, repeat labs ordered prior to next appointment, reviewed medications and side effects in detail     Return visit in a few months.  Yearly physical.    Note: If blood pressure was rechecked on this appointment, an updated after visit summary can be accessed by the patient on [employer] with this information.         CHELSEA WEST MD, [facility]  Department of Family Medicine of [facility] - Primary Care    Disclaimer: Note that this documentation was dictated utilizing voice recognition software. This software can occasionally cause typographical errors within the documentation such as errors in the vocabulary, misspellings, and grammatical issues.  If there are any questions or need for clarification, please contact the provider for more information.

## 2025-03-18 NOTE — ASSESSMENT & PLAN NOTE
chronic issue, stable, and initial encounter. Continue current management. Will recheck labs, nutritional labs yearly.

## 2025-03-18 NOTE — ASSESSMENT & PLAN NOTE
chronic issue, stable, and initial encounter. Continue current management. Refill sent. Check CBC and Cmp yearly.

## 2025-03-18 NOTE — ASSESSMENT & PLAN NOTE
chronic issue, stable, and initial encounter. Continue current management. Refill sent. Continue exercise.

## 2025-03-20 ENCOUNTER — PHARMACY VISIT (OUTPATIENT)
Dept: PHARMACY | Facility: CLINIC | Age: 50
End: 2025-03-20
Payer: MEDICAID

## 2025-03-21 ENCOUNTER — APPOINTMENT (OUTPATIENT)
Dept: PRIMARY CARE | Facility: CLINIC | Age: 50
End: 2025-03-21
Payer: COMMERCIAL

## 2025-03-27 PROCEDURE — RXMED WILLOW AMBULATORY MEDICATION CHARGE

## 2025-03-28 ENCOUNTER — HOSPITAL ENCOUNTER (OUTPATIENT)
Dept: RADIOLOGY | Facility: CLINIC | Age: 50
Discharge: HOME | End: 2025-03-28
Payer: COMMERCIAL

## 2025-03-28 ENCOUNTER — PHARMACY VISIT (OUTPATIENT)
Dept: PHARMACY | Facility: CLINIC | Age: 50
End: 2025-03-28
Payer: MEDICAID

## 2025-03-28 DIAGNOSIS — Z12.31 SCREENING MAMMOGRAM FOR BREAST CANCER: ICD-10-CM

## 2025-03-28 PROCEDURE — RXMED WILLOW AMBULATORY MEDICATION CHARGE

## 2025-03-28 PROCEDURE — 77067 SCR MAMMO BI INCL CAD: CPT

## 2025-03-31 ENCOUNTER — PHARMACY VISIT (OUTPATIENT)
Dept: PHARMACY | Facility: CLINIC | Age: 50
End: 2025-03-31
Payer: MEDICAID

## 2025-03-31 DIAGNOSIS — J45.40 MODERATE PERSISTENT ASTHMA WITHOUT COMPLICATION (HHS-HCC): Primary | ICD-10-CM

## 2025-04-01 PROCEDURE — RXMED WILLOW AMBULATORY MEDICATION CHARGE

## 2025-04-01 RX ORDER — ALBUTEROL SULFATE 90 UG/1
2 INHALANT RESPIRATORY (INHALATION) EVERY 4 HOURS PRN
Qty: 6.7 G | Refills: 4 | Status: SHIPPED | OUTPATIENT
Start: 2025-04-01

## 2025-04-03 ENCOUNTER — PHARMACY VISIT (OUTPATIENT)
Dept: PHARMACY | Facility: CLINIC | Age: 50
End: 2025-04-03
Payer: MEDICAID

## 2025-04-04 PROCEDURE — RXMED WILLOW AMBULATORY MEDICATION CHARGE

## 2025-04-07 PROCEDURE — RXMED WILLOW AMBULATORY MEDICATION CHARGE

## 2025-04-08 ENCOUNTER — PHARMACY VISIT (OUTPATIENT)
Dept: PHARMACY | Facility: CLINIC | Age: 50
End: 2025-04-08
Payer: MEDICAID

## 2025-04-09 ENCOUNTER — PHARMACY VISIT (OUTPATIENT)
Dept: PHARMACY | Facility: CLINIC | Age: 50
End: 2025-04-09
Payer: MEDICAID

## 2025-04-12 PROCEDURE — RXMED WILLOW AMBULATORY MEDICATION CHARGE

## 2025-04-13 PROCEDURE — RXMED WILLOW AMBULATORY MEDICATION CHARGE

## 2025-04-15 ENCOUNTER — PHARMACY VISIT (OUTPATIENT)
Dept: PHARMACY | Facility: CLINIC | Age: 50
End: 2025-04-15
Payer: MEDICAID

## 2025-04-20 PROCEDURE — RXMED WILLOW AMBULATORY MEDICATION CHARGE

## 2025-04-22 PROCEDURE — RXMED WILLOW AMBULATORY MEDICATION CHARGE

## 2025-04-23 ENCOUNTER — PHARMACY VISIT (OUTPATIENT)
Dept: PHARMACY | Facility: CLINIC | Age: 50
End: 2025-04-23
Payer: MEDICAID

## 2025-04-23 DIAGNOSIS — R52 PAIN: ICD-10-CM

## 2025-04-23 PROCEDURE — RXMED WILLOW AMBULATORY MEDICATION CHARGE

## 2025-04-23 RX ORDER — ACETAMINOPHEN 500 MG
500 TABLET ORAL EVERY 4 HOURS PRN
Qty: 60 TABLET | Refills: 3 | Status: SHIPPED | OUTPATIENT
Start: 2025-04-23

## 2025-04-24 ENCOUNTER — PHARMACY VISIT (OUTPATIENT)
Dept: PHARMACY | Facility: CLINIC | Age: 50
End: 2025-04-24
Payer: MEDICAID

## 2025-04-29 PROCEDURE — RXMED WILLOW AMBULATORY MEDICATION CHARGE

## 2025-04-30 PROCEDURE — RXMED WILLOW AMBULATORY MEDICATION CHARGE

## 2025-05-01 ENCOUNTER — PHARMACY VISIT (OUTPATIENT)
Dept: PHARMACY | Facility: CLINIC | Age: 50
End: 2025-05-01
Payer: MEDICAID

## 2025-05-03 ENCOUNTER — PHARMACY VISIT (OUTPATIENT)
Dept: PHARMACY | Facility: CLINIC | Age: 50
End: 2025-05-03
Payer: MEDICAID

## 2025-05-15 LAB
25(OH)D3+25(OH)D2 SERPL-MCNC: 95 NG/ML (ref 30–100)
ALBUMIN SERPL-MCNC: 3.9 G/DL (ref 3.6–5.1)
ALP SERPL-CCNC: 38 U/L (ref 37–153)
ALT SERPL-CCNC: 22 U/L (ref 6–29)
ANION GAP SERPL CALCULATED.4IONS-SCNC: 9 MMOL/L (CALC) (ref 7–17)
AST SERPL-CCNC: 21 U/L (ref 10–35)
BASOPHILS # BLD AUTO: 29 CELLS/UL (ref 0–200)
BASOPHILS NFR BLD AUTO: 0.7 %
BILIRUB SERPL-MCNC: 0.3 MG/DL (ref 0.2–1.2)
BUN SERPL-MCNC: 10 MG/DL (ref 7–25)
CALCIUM SERPL-MCNC: 8.6 MG/DL (ref 8.6–10.4)
CHLORIDE SERPL-SCNC: 107 MMOL/L (ref 98–110)
CHOLEST SERPL-MCNC: 150 MG/DL
CHOLEST/HDLC SERPL: 2 (CALC)
CO2 SERPL-SCNC: 24 MMOL/L (ref 20–32)
CREAT SERPL-MCNC: 0.88 MG/DL (ref 0.5–1.03)
EGFRCR SERPLBLD CKD-EPI 2021: 80 ML/MIN/1.73M2
EOSINOPHIL # BLD AUTO: 103 CELLS/UL (ref 15–500)
EOSINOPHIL NFR BLD AUTO: 2.5 %
ERYTHROCYTE [DISTWIDTH] IN BLOOD BY AUTOMATED COUNT: 13.1 % (ref 11–15)
GLUCOSE SERPL-MCNC: 86 MG/DL (ref 65–99)
HCT VFR BLD AUTO: 40.9 % (ref 35–45)
HDLC SERPL-MCNC: 76 MG/DL
HGB BLD-MCNC: 12.9 G/DL (ref 11.7–15.5)
LDLC SERPL CALC-MCNC: 62 MG/DL (CALC)
LYMPHOCYTES # BLD AUTO: 1763 CELLS/UL (ref 850–3900)
LYMPHOCYTES NFR BLD AUTO: 43 %
MCH RBC QN AUTO: 31.4 PG (ref 27–33)
MCHC RBC AUTO-ENTMCNC: 31.5 G/DL (ref 32–36)
MCV RBC AUTO: 99.5 FL (ref 80–100)
MONOCYTES # BLD AUTO: 283 CELLS/UL (ref 200–950)
MONOCYTES NFR BLD AUTO: 6.9 %
NEUTROPHILS # BLD AUTO: 1923 CELLS/UL (ref 1500–7800)
NEUTROPHILS NFR BLD AUTO: 46.9 %
NONHDLC SERPL-MCNC: 74 MG/DL (CALC)
PLATELET # BLD AUTO: 261 THOUSAND/UL (ref 140–400)
PMV BLD REES-ECKER: 10.2 FL (ref 7.5–12.5)
POTASSIUM SERPL-SCNC: 4.1 MMOL/L (ref 3.5–5.3)
PROT SERPL-MCNC: 6.3 G/DL (ref 6.1–8.1)
RBC # BLD AUTO: 4.11 MILLION/UL (ref 3.8–5.1)
SODIUM SERPL-SCNC: 140 MMOL/L (ref 135–146)
TRIGL SERPL-MCNC: 42 MG/DL
TSH SERPL-ACNC: 2.78 MIU/L
VIT B12 SERPL-MCNC: >2000 PG/ML (ref 200–1100)
WBC # BLD AUTO: 4.1 THOUSAND/UL (ref 3.8–10.8)

## 2025-05-22 ASSESSMENT — PROMIS GLOBAL HEALTH SCALE
EMOTIONAL_PROBLEMS: RARELY
RATE_AVERAGE_FATIGUE: MODERATE
CARRYOUT_SOCIAL_ACTIVITIES: VERY GOOD
RATE_GENERAL_HEALTH: GOOD
RATE_AVERAGE_PAIN: 2
RATE_QUALITY_OF_LIFE: VERY GOOD
RATE_MENTAL_HEALTH: VERY GOOD
RATE_SOCIAL_SATISFACTION: FAIR
CARRYOUT_PHYSICAL_ACTIVITIES: COMPLETELY
RATE_PHYSICAL_HEALTH: VERY GOOD

## 2025-05-23 ENCOUNTER — APPOINTMENT (OUTPATIENT)
Dept: PRIMARY CARE | Facility: CLINIC | Age: 50
End: 2025-05-23
Payer: COMMERCIAL

## 2025-05-23 VITALS
BODY MASS INDEX: 22.48 KG/M2 | DIASTOLIC BLOOD PRESSURE: 83 MMHG | SYSTOLIC BLOOD PRESSURE: 150 MMHG | HEART RATE: 79 BPM | WEIGHT: 141.4 LBS | OXYGEN SATURATION: 94 %

## 2025-05-23 DIAGNOSIS — Z23 NEED FOR ZOSTER VACCINE: ICD-10-CM

## 2025-05-23 DIAGNOSIS — Z00.00 LABORATORY EXAMINATION ORDERED AS PART OF A ROUTINE GENERAL MEDICAL EXAMINATION: ICD-10-CM

## 2025-05-23 DIAGNOSIS — J45.20 MILD INTERMITTENT ASTHMA, UNCOMPLICATED (HHS-HCC): ICD-10-CM

## 2025-05-23 DIAGNOSIS — Z00.00 ROUTINE GENERAL MEDICAL EXAMINATION AT A HEALTH CARE FACILITY: Primary | ICD-10-CM

## 2025-05-23 DIAGNOSIS — G47.00 INSOMNIA, UNSPECIFIED TYPE: ICD-10-CM

## 2025-05-23 DIAGNOSIS — R32 INCONTINENCE IN FEMALE: ICD-10-CM

## 2025-05-23 PROBLEM — J45.909 ASTHMA: Status: RESOLVED | Noted: 2023-04-14 | Resolved: 2025-05-23

## 2025-05-23 PROCEDURE — RXMED WILLOW AMBULATORY MEDICATION CHARGE

## 2025-05-23 PROCEDURE — 99214 OFFICE O/P EST MOD 30 MIN: CPT | Performed by: STUDENT IN AN ORGANIZED HEALTH CARE EDUCATION/TRAINING PROGRAM

## 2025-05-23 PROCEDURE — 99396 PREV VISIT EST AGE 40-64: CPT | Performed by: STUDENT IN AN ORGANIZED HEALTH CARE EDUCATION/TRAINING PROGRAM

## 2025-05-23 PROCEDURE — 1036F TOBACCO NON-USER: CPT | Performed by: STUDENT IN AN ORGANIZED HEALTH CARE EDUCATION/TRAINING PROGRAM

## 2025-05-23 PROCEDURE — 90750 HZV VACC RECOMBINANT IM: CPT | Performed by: STUDENT IN AN ORGANIZED HEALTH CARE EDUCATION/TRAINING PROGRAM

## 2025-05-23 PROCEDURE — 90471 IMMUNIZATION ADMIN: CPT | Performed by: STUDENT IN AN ORGANIZED HEALTH CARE EDUCATION/TRAINING PROGRAM

## 2025-05-23 RX ORDER — TALC
3 POWDER (GRAM) TOPICAL NIGHTLY
Qty: 30 TABLET | Refills: 11 | Status: SHIPPED | OUTPATIENT
Start: 2025-05-23

## 2025-05-23 ASSESSMENT — ENCOUNTER SYMPTOMS
VOMITING: 0
COLOR CHANGE: 0
DIARRHEA: 0
SHORTNESS OF BREATH: 0
COUGH: 0
CHILLS: 0
DIFFICULTY URINATING: 0
FEVER: 0
DEPRESSION: 0
DIZZINESS: 0
CONSTIPATION: 0
OCCASIONAL FEELINGS OF UNSTEADINESS: 0
WHEEZING: 0
HEADACHES: 0
FATIGUE: 0
LOSS OF SENSATION IN FEET: 0
NAUSEA: 0
ADENOPATHY: 0
ABDOMINAL PAIN: 0

## 2025-05-23 NOTE — ASSESSMENT & PLAN NOTE
Chronic issue, stable, subsequent encounter. Continue current mgmt. No s/sx, lungs normal on exam.

## 2025-05-23 NOTE — PATIENT INSTRUCTIONS
Please make an appointment in 1 year for you next yearly physical/wellness.    Scheduling Instructions:   For referral [phone]   For general [phone] ([phone] ([phone])     Visit [facility] > Open Orders to schedule appointments online.

## 2025-05-23 NOTE — PROGRESS NOTES
[facility] - Primary Care  [address], [address]  [place] [address]    Subjective     Patient ID: Deirdre Rodriguez is a 49 y.o. female who presents for  Follow-up     Coming in for physical.    Having some bowel incontinence. Has been taking fiber supplements for the past 1-2 months now. She is getting worse symptoms now. She is going to see GI in June.    Is due for shingles.        Review of Systems   Constitutional:  Negative for chills, fatigue and fever.   HENT:  Negative for congestion.    Eyes:  Negative for visual disturbance.   Respiratory:  Negative for cough, shortness of breath and wheezing.    Cardiovascular:  Negative for chest pain.   Gastrointestinal:  Negative for abdominal pain, constipation, diarrhea, nausea and vomiting.   Genitourinary:  Negative for difficulty urinating.   Musculoskeletal:  Negative for gait problem.   Skin:  Negative for color change.   Neurological:  Negative for dizziness, syncope and headaches.   Hematological:  Negative for adenopathy.       Social History[1]  Family History[2]  RX Allergies[3]   Current Medications[4]    /83 (BP Location: Right arm, Patient Position: Sitting, BP Cuff Size: Adult)   Pulse 79   Wt 64.1 kg (141 lb 6.4 oz)   LMP 09/11/2021   SpO2 94%   BMI 22.48 kg/m²      Objective   Physical Exam  Vitals reviewed.   Constitutional:       General: She is not in acute distress.     Appearance: Normal appearance. She is not ill-appearing, toxic-appearing or diaphoretic.   HENT:      Head: Normocephalic and atraumatic.      Right Ear: Tympanic membrane, ear canal and external ear normal. There is no impacted cerumen.      Left Ear: Tympanic membrane, ear canal and external ear normal. There is no impacted cerumen.      Nose: Nose normal. No congestion.      Mouth/Throat:      Mouth: Mucous membranes are moist.      Pharynx: Oropharynx is clear. No oropharyngeal exudate or posterior oropharyngeal erythema.   Eyes:      General:          Right eye: No discharge.         Left eye: No discharge.      Extraocular Movements: Extraocular movements intact.      Conjunctiva/sclera: Conjunctivae normal.      Pupils: Pupils are equal, round, and reactive to light.   Neck:      Comments: No thyromegaly  Cardiovascular:      Rate and Rhythm: Normal rate and regular rhythm.      Pulses: Normal pulses.      Heart sounds: Normal heart sounds. No murmur heard.     No friction rub. No gallop.   Pulmonary:      Effort: Pulmonary effort is normal. No respiratory distress.      Breath sounds: Normal breath sounds. No stridor. No wheezing, rhonchi or rales.   Chest:      Chest wall: No tenderness.   Abdominal:      General: Abdomen is flat. Bowel sounds are normal. There is no distension.      Palpations: Abdomen is soft. There is no mass.      Tenderness: There is no abdominal tenderness. There is no guarding or rebound.      Hernia: No hernia is present.   Musculoskeletal:         General: No swelling or tenderness. Normal range of motion.      Cervical back: Normal range of motion and neck supple. No rigidity or tenderness.      Right lower leg: No edema.      Left lower leg: No edema.   Lymphadenopathy:      Cervical: No cervical adenopathy.   Skin:     General: Skin is warm and dry.      Coloration: Skin is not jaundiced or pale.   Neurological:      General: No focal deficit present.      Mental Status: She is alert and oriented to person, place, and time.      Gait: Gait normal.   Psychiatric:         Mood and Affect: Mood normal.         Behavior: Behavior normal.         Thought Content: Thought content normal.         Judgment: Judgment normal.           Labs:   Lab Results   Component Value Date    WBC 4.1 05/14/2025    HGB 12.9 05/14/2025    HCT 40.9 05/14/2025     05/14/2025    TSH 2.78 05/14/2025    INR 1.1 11/02/2022     Lab Results   Component Value Date     05/14/2025    K 4.1 05/14/2025     05/14/2025    BUN 10 05/14/2025     "CREATININE 0.88 05/14/2025    GLUCOSE 86 05/14/2025    CALCIUM 8.6 05/14/2025    PROT 6.3 05/14/2025    BILITOT 0.3 05/14/2025    ALKPHOS 38 05/14/2025    AST 21 05/14/2025    ALT 22 05/14/2025     Lab Results   Component Value Date    CHOL 150 05/14/2025    CHOL 112 06/15/2020    CHOL 148 03/29/2019      Lab Results   Component Value Date    TRIG 42 05/14/2025    TRIG 59 06/15/2020    TRIG 60 03/29/2019      Lab Results   Component Value Date    HDL 76 05/14/2025    HDL 50.5 06/15/2020    HDL 45.2 03/29/2019     Lab Results   Component Value Date    LDLCALC 62 05/14/2025      Lab Results   Component Value Date    VLDL 12 06/15/2020    VLDL 12 03/29/2019    No components found for: \"CHOLHDLRATI0\"    No data recorded    Imaging/Testing: BI mammo bilateral screening tomosynthesis  Narrative: Interpreted By:  Eliz Cooley,   STUDY:  BI MAMMO BILATERAL SCREENING TOMOSYNTHESIS;  3/28/2025 2:08 pm      ACCESSION NUMBER(S):  [id number]      ORDERING CLINICIAN:  SELF MAMMOGRAM      INDICATION:  Screening. Bilateral breast augmentation      ,Z12.31 Encounter for screening mammogram for malignant neoplasm of  breast      COMPARISON:  03/27/2024, 01/18/2023, 12/02/2021, 05/26/2021      FINDINGS:  2D and tomosynthesis images were reviewed at 1 mm slice thickness.  Implant and implant displaced views were acquired. There are  subpectoral saline implants in place.      Density:  There are scattered areas of fibroglandular density.      No suspicious masses or calcifications are identified.      Impression: No mammographic evidence of malignancy.      BI-RADS CATEGORY:  BI-RADS Category:  2 Benign.  Recommendation:  Annual Screening.  Recommended Date:  1 Year.  Laterality:  Bilateral.              For any future breast imaging appointments, please call [phone] ([phone]).          MACRO:  None      Signed by: Eliz Cooley 4/1/2025 8:45 AM  Dictation workstation:   [id number]    Assessment/Plan   Problem List Items Addressed " This Visit       Insomnia    Chronic issue, stable, subsequent encounter. Continue with melatonin.         Relevant Medications    melatonin 3 mg tablet    Mild intermittent asthma, uncomplicated (HHS-HCC)    Chronic issue, stable, subsequent encounter. Continue current mgmt. No s/sx, lungs normal on exam.          Other Visit Diagnoses         Routine general medical examination at a health care facility    -  Primary      Laboratory examination ordered as part of a routine general medical examination          Incontinence in female        Relevant Orders    Referral to Urogynecology      Need for zoster vaccine        Relevant Orders    Zoster vaccine, Recombinant, Adult (SHINGRIX) (Completed)          Incontinence - chronic issue, uncontrolled, initial encounter. Will place referral Urogyn. Continue follow up with GI.    As part of today's Preventative Visit, an age and gender-appropriate history and physical was performed, as documented below. Counseling and anticipatory guidance were performed, and risk factor reduction interventions (including [place] Preventative Services Task Force recommended screening tests) were utilized/ordered as outlined in the above Assessment and Plan. All patient medications were reviewed, and refilled if necessary. Patient and I discussed diet/nutrition, lifestyle modifications, safety, medication indications and side effects, and health goals.    current treatment plan is effective, no change in therapy, orders and follow up as documented in EMR, lab results reviewed with patient, repeat labs ordered prior to next appointment, reviewed compliance with lifestyle measures, reviewed diet, exercise and weight control, reviewed medications and side effects in detail     Return visit in 1 year for next yearly physical.           CHELSEA WEST MD, [facility]  Department of Family Medicine of [facility] - Primary Care         [1]   Social  History  Tobacco Use    Smoking status: Never    Smokeless tobacco: Never   Vaping Use    Vaping status: Never Used   Substance Use Topics    Alcohol use: Yes     Alcohol/week: 4.0 standard drinks of alcohol     Types: 4 Glasses of wine per week     Comment: 1drink a month    Drug use: Never   [2]   Family History  Problem Relation Name Age of Onset    Alcohol abuse Mother Delphine Rodriguez     Hypertension Maternal Grandmother Lizett Yoo     Arthritis Maternal Grandmother Lizett Yoo    [3] No Known Allergies  [4]   Current Outpatient Medications   Medication Sig Dispense Refill    acetaminophen (Pain Relief ES, acetaminophen,) 500 mg tablet Take 1 tablet (500 mg) by mouth every 4 hours if needed for mild pain (1 - 3). 60 tablet 3    albuterol 90 mcg/actuation inhaler Inhale 2 puffs every 4 hours if needed as instructed. 18 g 5    albuterol 90 mcg/actuation inhaler Inhale 2 puffs every 4 hours if needed for wheezing or shortness of breath. 6.7 g 4    benzoyl peroxide 5 % external wash Apply 1 Application topically once daily. In the morning 237 g 11    clindamycin (Cleocin T) 1 % lotion Apply topically 2 times a day. 60 mL 11    EPINEPHrine 0.3 mg/0.3 mL injection syringe INJECT 0.3 ML INTRAMUSCULARLY AS NEEDED.      fluticasone (Flonase) 50 mcg/actuation nasal spray Administer 1 spray into each nostril 2 times a day. 16 g 5    guaiFENesin (Mucinex) 600 mg 12 hr tablet 1 tablet as needed Orally every 12 hrs 30 days 60 tablet 4    ipratropium-albuteroL (Duo-Neb) 0.5-2.5 mg/3 mL nebulizer solution 3 mL Inhalation every 4 hrs As needed for shortness of breath 90 days 1620 mL 4    loratadine (Claritin) 10 mg tablet Take 1 tablet (10 mg) by mouth once daily.      loratadine (Claritin) 10 mg tablet 1 tablet Orally Once a day 30 days 30 tablet 4    mometasone-formoterol (Dulera) 200-5 mcg/actuation inhaler 2 puffs Inhalation Twice a day 30 days 13 g 4    montelukast (Singulair) 10 mg tablet Take 1 tablet (10 mg) by  mouth once daily at bedtime. 30 tablet 3    montelukast (Singulair) 10 mg tablet Take 1 tablet Orally Once a day 30 days 30 tablet 4    polyethylene glycol (Glycolax, Miralax) 17 gram packet Take 17 g by mouth once daily. 30 packet 11    tretinoin (Retin-A) 0.1 % cream Apply 1 Application topically once daily at bedtime. Start 1-2 nights per week 1-2 weeks, then increase to every other night, then every night as tolerated 45 g 11    valACYclovir (Valtrex) 500 mg tablet Take 1 tablet (500 mg) by mouth once daily. 90 tablet 1    Vitamin B-12 5,000 mcg tablet, sublingual DISSOLVE ONE TABLET UNDER TONGUE ONCE DAILY 90 tablet 0    Xolair 150 mg/mL subcutaneous syringe Inject 1 mL (150 mg) under the skin every 14 (fourteen) days.      melatonin 3 mg tablet Take 1 tablet (3 mg) by mouth once daily at bedtime. 30 tablet 11    polyethylene glycol (Glycolax, Miralax) 17 gram packet Take 17 g by mouth once daily into 8 ounces of fluid. 90 packet 0    traZODone (Desyrel) 50 mg tablet Take 1 tablet (50 mg) by mouth as needed at bedtime for sleep. 90 tablet 0     No current facility-administered medications for this visit.

## 2025-05-27 ENCOUNTER — PHARMACY VISIT (OUTPATIENT)
Dept: PHARMACY | Facility: CLINIC | Age: 50
End: 2025-05-27
Payer: MEDICAID

## 2025-06-01 PROCEDURE — RXMED WILLOW AMBULATORY MEDICATION CHARGE

## 2025-06-02 ENCOUNTER — APPOINTMENT (OUTPATIENT)
Dept: OBSTETRICS AND GYNECOLOGY | Facility: CLINIC | Age: 50
End: 2025-06-02
Payer: COMMERCIAL

## 2025-06-02 VITALS
DIASTOLIC BLOOD PRESSURE: 70 MMHG | HEIGHT: 66 IN | SYSTOLIC BLOOD PRESSURE: 110 MMHG | WEIGHT: 130 LBS | BODY MASS INDEX: 20.89 KG/M2

## 2025-06-02 DIAGNOSIS — Z01.419 ENCOUNTER FOR ANNUAL ROUTINE GYNECOLOGICAL EXAMINATION: Primary | ICD-10-CM

## 2025-06-02 PROCEDURE — 3008F BODY MASS INDEX DOCD: CPT | Performed by: OBSTETRICS & GYNECOLOGY

## 2025-06-02 PROCEDURE — 1036F TOBACCO NON-USER: CPT | Performed by: OBSTETRICS & GYNECOLOGY

## 2025-06-02 PROCEDURE — 99396 PREV VISIT EST AGE 40-64: CPT | Performed by: OBSTETRICS & GYNECOLOGY

## 2025-06-02 ASSESSMENT — ENCOUNTER SYMPTOMS
GASTROINTESTINAL NEGATIVE: 0
NEUROLOGICAL NEGATIVE: 0
HEMATOLOGIC/LYMPHATIC NEGATIVE: 0
EYES NEGATIVE: 0
CARDIOVASCULAR NEGATIVE: 0
ENDOCRINE NEGATIVE: 0
MUSCULOSKELETAL NEGATIVE: 0
ALLERGIC/IMMUNOLOGIC NEGATIVE: 0
PSYCHIATRIC NEGATIVE: 0
RESPIRATORY NEGATIVE: 0
CONSTITUTIONAL NEGATIVE: 0

## 2025-06-02 ASSESSMENT — PAIN SCALES - GENERAL: PAINLEVEL_OUTOF10: 0-NO PAIN

## 2025-06-02 NOTE — PROGRESS NOTES
Subjective   Patient ID: Deirdre Rodriguez is a 49 y.o. female who presents for Annual Exam.  Pt is having fecal incontinence  but denies urinary incontinence     Pt had gastric bypass in 2019  She had a hysterectomy in 2022 Pt denies hot flashes.        Review of Systems   All other systems reviewed and are negative.      Objective   Physical Exam  Constitutional:       Appearance: She is normal weight.   Pulmonary:      Effort: Pulmonary effort is normal.   Chest:   Breasts:     Right: Normal.      Left: Normal.   Genitourinary:     General: Normal vulva.      Vagina: Normal.      Adnexa: Right adnexa normal and left adnexa normal.      Rectum: Normal.   Musculoskeletal:      Cervical back: Neck supple.   Skin:     General: Skin is warm.   Psychiatric:         Attention and Perception: Attention normal.         Mood and Affect: Mood normal.         Behavior: Behavior normal.         Assessment/Plan   Diagnoses and all orders for this visit:  Encounter for annual routine gynecological examination           Eva Iverson MD 06/02/25 1:39 PM

## 2025-06-03 ENCOUNTER — PHARMACY VISIT (OUTPATIENT)
Dept: PHARMACY | Facility: CLINIC | Age: 50
End: 2025-06-03
Payer: MEDICAID

## 2025-06-25 ENCOUNTER — OFFICE VISIT (OUTPATIENT)
Dept: GASTROENTEROLOGY | Facility: CLINIC | Age: 50
End: 2025-06-25
Payer: COMMERCIAL

## 2025-06-25 VITALS
DIASTOLIC BLOOD PRESSURE: 73 MMHG | SYSTOLIC BLOOD PRESSURE: 119 MMHG | WEIGHT: 142 LBS | HEIGHT: 66 IN | HEART RATE: 61 BPM | BODY MASS INDEX: 22.82 KG/M2 | TEMPERATURE: 97.5 F

## 2025-06-25 DIAGNOSIS — K59.09 CHRONIC CONSTIPATION: ICD-10-CM

## 2025-06-25 DIAGNOSIS — Z98.84 HISTORY OF ROUX-EN-Y GASTRIC BYPASS: ICD-10-CM

## 2025-06-25 DIAGNOSIS — R15.9 INCONTINENCE OF FECES, UNSPECIFIED FECAL INCONTINENCE TYPE: ICD-10-CM

## 2025-06-25 PROCEDURE — 99212 OFFICE O/P EST SF 10 MIN: CPT | Performed by: NURSE PRACTITIONER

## 2025-06-25 PROCEDURE — 99243 OFF/OP CNSLTJ NEW/EST LOW 30: CPT | Performed by: NURSE PRACTITIONER

## 2025-06-25 PROCEDURE — 3008F BODY MASS INDEX DOCD: CPT | Performed by: NURSE PRACTITIONER

## 2025-06-25 RX ORDER — BIOTIN 10 MG
1 TABLET ORAL DAILY
COMMUNITY

## 2025-06-25 RX ORDER — BISMUTH SUBSALICYLATE 262 MG
1 TABLET,CHEWABLE ORAL DAILY
COMMUNITY

## 2025-06-25 RX ORDER — VIT C/E/ZN/COPPR/LUTEIN/ZEAXAN 250MG-90MG
180 CAPSULE ORAL DAILY
COMMUNITY

## 2025-06-25 ASSESSMENT — ENCOUNTER SYMPTOMS
SHORTNESS OF BREATH: 0
BACK PAIN: 0
FATIGUE: 0
ARTHRALGIAS: 0
HALLUCINATIONS: 0
MYALGIAS: 0
DIAPHORESIS: 0
JOINT SWELLING: 0
WEAKNESS: 0
HEADACHES: 0
NUMBNESS: 0
WHEEZING: 0
COUGH: 0
EYE PAIN: 0
AGITATION: 0
LIGHT-HEADEDNESS: 0
NERVOUS/ANXIOUS: 0
SORE THROAT: 0
DIZZINESS: 0
FLANK PAIN: 0
FEVER: 0
PALPITATIONS: 0
FREQUENCY: 0
HEMATURIA: 0
ADENOPATHY: 0
PHOTOPHOBIA: 0
DYSURIA: 0
CHILLS: 0

## 2025-06-25 ASSESSMENT — PAIN SCALES - GENERAL: PAINLEVEL_OUTOF10: 0-NO PAIN

## 2025-06-25 NOTE — PROGRESS NOTES
Subjective   Patient ID: Deirdre Rodriguez is a 49 y.o. female who presents for fecal incontinence per PCP referral.    This is a 49-year-old AAF with history of morbid obesity s/p RYGB (9/2019 ), asthma, allergic rhinitis, ADHD, anxiety, and genital herpes who is presenting to the GI clinic for initial visit.      History per patient and review of EMR    Of note, patient saw Dr. Herrera in the  GI clinic in 2021.     Patient reports losing 100 pounds since her gastric bypass surgery.  She is very health-conscious and she works out frequently.    Reports shortly after her gastric bypass in 2019 she had an episode of fecal incontinence.  She was doing well for some time, but in the last year she's had a resurgence of fecal incontinence.  Sometimes she has a bowel movement without knowing it.  She has fecal incontinence when she passes flatulence at times.    She eats candy with erythritol.    ROS positive for constipation for her entire life.  Reports trying methylcellulose and MiraLAX in the past without any significant relief.  She describes her stools as pellets.  She has gone up to 3 days without moving her bowels.    Denies unintentional weight loss, vomiting, heartburn, change in bowel habits, hematemesis, hematochezia, and melena.     Screening colonoscopy 2021 :   - Preparation of the colon was fair.   - Hemorrhoids found on perianal exam.   - Stool in the entire examined colon.   - Melanosis in the colon.   - Non-bleeding external hemorrhoids.   - No specimens collected.   - Repeat colonoscopy with extended preparation in 5 years for screening purposes and because the bowel preparation was suboptimal.    Past medical history:  See above   DEJAN (resolved after RYGB)    Past surgical history:   See above   ORIF left fibular fracture with syndesmotic repair  (12/2018 )   Bilateral breast implants (1999)   Total laparoscopic hysterectomy with bilateral salpingectomy for AUB/fibroid uterus (3/2022 )  "  Left hip replacement (11/11/22 )     Family history:  No known GI cancers or IBD    Social history:   Denies use of alcohol, tobacco, and illicit drugs     Allergies[1]     Current Medications[2]     Review of Systems   Constitutional:  Negative for chills, diaphoresis, fatigue and fever.   HENT:  Negative for congestion, ear pain, hearing loss, sneezing and sore throat.    Eyes:  Negative for photophobia, pain and visual disturbance.   Respiratory:  Negative for cough, shortness of breath and wheezing.    Cardiovascular:  Negative for chest pain, palpitations and leg swelling.   Endocrine: Negative for cold intolerance and heat intolerance.   Genitourinary:  Negative for dysuria, flank pain, frequency and hematuria.   Musculoskeletal:  Negative for arthralgias, back pain, gait problem, joint swelling and myalgias.   Skin:  Negative for rash.   Neurological:  Negative for dizziness, syncope, weakness, light-headedness, numbness and headaches.   Hematological:  Negative for adenopathy.   Psychiatric/Behavioral:  Negative for agitation and hallucinations. The patient is not nervous/anxious.        Objective     Lab Results   Component Value Date    WBC 4.1 05/14/2025    HGB 12.9 05/14/2025    HCT 40.9 05/14/2025    MCV 99.5 05/14/2025     05/14/2025      Lab Results   Component Value Date    CREATININE 0.88 05/14/2025    BUN 10 05/14/2025     05/14/2025    K 4.1 05/14/2025     05/14/2025    CO2 24 05/14/2025      Lab Results   Component Value Date    ALT 22 05/14/2025    AST 21 05/14/2025    ALKPHOS 38 05/14/2025    BILITOT 0.3 05/14/2025      Lab Results   Component Value Date    TSH 2.78 05/14/2025        /73   Pulse 61   Temp 36.4 °C (97.5 °F) (Temporal)   Ht 1.676 m (5' 6\")   Wt 64.4 kg (142 lb)   LMP 09/11/2021   BMI 22.92 kg/m²     Physical Exam  Exam conducted with a chaperone present.   Constitutional:       General: She is not in acute distress.     Appearance: Normal " appearance.   HENT:      Head: Normocephalic and atraumatic.   Eyes:      Conjunctiva/sclera: Conjunctivae normal.   Cardiovascular:      Rate and Rhythm: Normal rate and regular rhythm.      Heart sounds: No murmur heard.     No gallop.   Pulmonary:      Effort: Pulmonary effort is normal.      Breath sounds: Normal breath sounds.   Abdominal:      General: Bowel sounds are normal. There is no distension.      Tenderness: There is no abdominal tenderness. There is no guarding.   Musculoskeletal:         General: No swelling or deformity. Normal range of motion.      Cervical back: Normal range of motion. No rigidity.   Skin:     General: Skin is warm and dry.      Coloration: Skin is not jaundiced.      Findings: No lesion or rash.   Neurological:      General: No focal deficit present.      Mental Status: She is alert and oriented to person, place, and time.   Psychiatric:         Mood and Affect: Mood normal.     SHERRY: Good anal sphincter tone, formed brown stool pieces in rectal vault, no hemorrhoids or anal fissures, FRITZ Churchill acting as chaperone for exam   Assessment/Plan   Problem List Items Addressed This Visit       History of Rosy-en-Y gastric bypass     Other Visit Diagnoses         Chronic constipation          Incontinence of feces, unspecified fecal incontinence type               1.  Fecal incontinence: With history and rectal exam findings today, I suspect this may be related to constipation with overflow incontinence.  Also question if this could be related to sugar alcohol intake (erythritol).  - Recommend OTC MiraLAX 17 grams  twice a day  - Discuss conservative measures to help with fecal incontinence such as avoidance of caffeine and sugar alcohols  - Consider colorectal surgery referral pending the above    2.  Colorectal cancer screening:   - Screening colonoscopy with extended bowel prep due in 2026 (next year)     3.  Follow-up:  - Return to clinic in 8-10 weeks         [1] No Known  Allergies  [2]   Current Outpatient Medications   Medication Sig Dispense Refill    acetaminophen (Pain Relief ES, acetaminophen,) 500 mg tablet Take 1 tablet (500 mg) by mouth every 4 hours if needed for mild pain (1 - 3). 60 tablet 3    albuterol 90 mcg/actuation inhaler Inhale 2 puffs every 4 hours if needed for wheezing or shortness of breath. 6.7 g 4    benzoyl peroxide 5 % external wash Apply 1 Application topically once daily. In the morning 237 g 11    biotin 10 mg tablet Take 1 tablet (10 mg) by mouth once daily.      cholecalciferol, vitamin D3, (VITAMIN D3 ORAL) Take by mouth.      clindamycin (Cleocin T) 1 % lotion Apply topically 2 times a day. 60 mL 11    fluticasone (Flonase) 50 mcg/actuation nasal spray Administer 1 spray into each nostril 2 times a day. 16 g 5    guaiFENesin (Mucinex) 600 mg 12 hr tablet 1 tablet as needed Orally every 12 hrs 30 days 60 tablet 4    ipratropium-albuteroL (Duo-Neb) 0.5-2.5 mg/3 mL nebulizer solution 3 mL Inhalation every 4 hrs As needed for shortness of breath 90 days 1620 mL 4    loratadine (Claritin) 10 mg tablet Take 1 tablet (10 mg) by mouth once daily.      MAGNESIUM GLYCINATE ORAL Take by mouth.      MAGNESIUM L-THREONATE ORAL Take by mouth.      melatonin 3 mg tablet Take 1 tablet (3 mg) by mouth once daily at bedtime. 30 tablet 11    mometasone-formoterol (Dulera) 200-5 mcg/actuation inhaler 2 puffs Inhalation Twice a day 30 days 13 g 4    montelukast (Singulair) 10 mg tablet Take 1 tablet (10 mg) by mouth once daily at bedtime. 30 tablet 3    multivitamin tablet Take 1 tablet by mouth once daily.      tretinoin (Retin-A) 0.1 % cream Apply 1 Application topically once daily at bedtime. Start 1-2 nights per week 1-2 weeks, then increase to every other night, then every night as tolerated 45 g 11    valACYclovir (Valtrex) 500 mg tablet Take 1 tablet (500 mg) by mouth once daily. 90 tablet 1    Vitamin B-12 5,000 mcg tablet, sublingual DISSOLVE ONE TABLET UNDER  TONGUE ONCE DAILY 90 tablet 0    vitamin E 180 mg (400 units) capsule Take 1 capsule (180 mg) by mouth once daily.      Xolair 150 mg/mL subcutaneous syringe Inject 1 mL (150 mg) under the skin every 14 (fourteen) days.      polyethylene glycol (Glycolax, Miralax) 17 gram packet Take 17 g by mouth once daily into 8 ounces of fluid. 90 packet 0     No current facility-administered medications for this visit.

## 2025-06-25 NOTE — PATIENT INSTRUCTIONS
Thanks for coming to the GI clinic.    Start taking MiraLAX 17 grams  twice a day.    Avoid  caffeine, alcohol, dairy products, cured or smoked meat, spicy foods, fruit, fatty foods, and dietary sweeteners such as sorbitol, xylitol, mannitol, and fructose may help. These sweeteners are often found in soft drinks and sugar-free chewing gum and candies.      Avoid erythritol for now.     Follow up in 8-10 weeks.

## 2025-07-04 PROCEDURE — RXMED WILLOW AMBULATORY MEDICATION CHARGE

## 2025-07-07 ENCOUNTER — PHARMACY VISIT (OUTPATIENT)
Dept: PHARMACY | Facility: CLINIC | Age: 50
End: 2025-07-07
Payer: MEDICAID

## 2025-07-08 NOTE — PROGRESS NOTES
PATIENT ID  Deirdre Rodriguez IS A 49 y.o. female WHO PRESENTS FOR      HPI   This 49-year-old female last seen in the office in January is being seen in follow-up.  At that time she was having a feeling of a foreign body possibly fishbone had developed after eating fish about 48 hours before that visit.  Examination was done fiberoptically showing no abnormalities regarding a foreign body although there was a surface abrasion along the posterior wall at the cricoid level.  She does have a history of some allergic rhinitis, ADHD, anxiety and has been followed by the GI service.  She status post gastric bypass surgery.  She has been noticing increasing discomfort with swallowing of spicy foods or hot liquids.  There is been no vocal change and she had no difficulty swallowing other substances or drinking liquids but different in nature.  ROS    A 12 point ROS  has been reviewed and are negative for complaint except for what is stated in the history of present illness and /or for past medical history as noted in the EMR.     Medical History[1]    Current Medications[2]    Social History[3]    RX Allergies[4]     vitals were not taken for this visit.     PHYSICAL EXAM  Examination:     CONSTITUTIONAL: Alert, in no acute distress, normal pitch/clarity of voice, well-developed, well-nourished, cooperative.  HEAD/FACE: Normocephalic, atraumatic, no tenderness over the sinuses, facial strength and movement symmetric.     SKIN: Good turgor, no rashes, no suspicious lesions, in the head and neck.     EYES: Both eyes have normal extraocular movements with no nystagmus, pupils are equal and reactive to light and accommodation, conjunctiva is clear.     EARS: Both ears are negative for external skin abnormalities, external auditory canals are without lesions or signs of inflammation, tympanic membranes are intact and are of normal color and texture, no effusions are seen, light reflexes normal, no mastoid tenderness is noted to  palpation, objective hearing is intact.     NOSE: No external skin lesions are noted, nares are patent, septum is intact and noninflamed, nasal turbinates are normal in appearance, sinuses are nontender to palpation bilaterally, no internal lesions or polyps are noted, no discharge is noted.     OROPHARYNX/ORAL CAVITY: Mucous membranes of the oropharynx and the oral cavity proper are without lesions or ulcerations, tongue mobility is normal and no lesions are noted, gingiva and alveolar mucosa is intact without lesions, oral mucosa is moist, muscular movement of the palate and gag reflex are normal.     NASOPHARYNX: Mucous membranes are noninflamed and no secretions or lesions are noted.  As per fiberoptic     LARYNX: See procedure note     NECK: No lymphadenopathy is palpated, neck is supple with full range of motion, thyroid is without swelling or tenderness, trachea is midline, no neck masses are noted.  No crepitus was normal, no thyroid enlargement was noted     Lymphatics: No cervical adenopathy or supraclavicular adenopathy noted to palpation.     HEART/VASCULAR: No jugular venous distention is noted, carotid pulsations are intact with a regular rate and rhythm noted,     PULMONARY: Good air movement with normal inspiratory/expiratory effort is noted, no audible wheezing is appreciated.     NEUROLOGIC: Alert and oriented, cranial nerves are grossly intact, gait is normal, sensation in the head and neck is intact,     PSYCH: oriented to person, place and time, normal mood and affect.     EXTREMITIES: No motor dysfunction of the upper and lower extremity is noted.    Patient ID: Deirdre Rodriguez is a 49 y.o. female.    Laryngoscopy    Date/Time: 7/9/2025 4:15 PM    Performed by: Omer Wood DMD, MD  Authorized by: Omer Wood DMD, MD    Consent:     Consent obtained:  Verbal    Consent given by:  Patient    Risks discussed:  Pain    Alternatives discussed:  No treatment and referral  Procedure  details:     Indications: hoarseness, dysphagia, or aspiration    Post-procedure details:     Patient tolerance of procedure:  Tolerated well, no immediate complications  Comments:      LARYNGOSCOPY    Indications:      Consent:  The procedure was discussed including the possible risks and benefits and alternative treatments were discussed with verbal consent obtained.    Procedure:  Topical Taco-Synephrine and lidocaine is applied as a decongestant and anesthetic nasally. A fiberoptic laryngoscope is inserted nasally and the upper aerodigestive tract is examined.    Findings: Intranasally there was no evidence for nasal purulent discharge, nasal polyps or significant obstructions from the septum or turbinates. The nasopharynx was normal showing no signs of abnormalities to the mucous membranes and there was no obstruction to the eustachian tubes. Base of the tongue and vallecula were within normal limits as was the supraglottic larynx, lateral walls of the oropharynx, and the hypopharyngeal mucosa.  The endolarynx revealed normal vocal cord movements which were symmetric and no lesions were noted. There were no secretions pooling in the post cricoid region.    Post procedure: The patient tolerated the procedure well without complications.      ASSESSMENT/PLAN  Problem List Items Addressed This Visit           ICD-10-CM    Fibromyalgia M79.7    History of Rosy-en-Y gastric bypass Z98.84     Other Visit Diagnoses         Codes      Moderate persistent asthma without complication ([facility])    -  Primary J45.40      Throat pain     R07.0    Relevant Orders    Laryngoscopy          I discussed the clinical finds with the patient.  Her exam today was negative for any signs of inflammation involving the hypopharynx laryngeal structures and there was no abnormalities noted in the oropharynx and oral cavity.  It appears that she has no difficulty swallowing other food groups that are not spicy and or beverages that are not  heated.  She has had GI surgery in the past and if problems of this nature persist since it seems to be in the upper esophageal area where she complains and she might need to see her GI provider although at the present time the use of Pepcid over-the-counter might be of benefit to reduce any potential reflux tendencies.  Her laryngeal and hypopharyngeal area today on examination was very normal with no signs of inflammation.  I did suggest that she avoid those food groups that are causing difficulties over this next 2 weeks and then reintroduce them to see if symptoms have improved.  If not then she needs to investigate this further.       [1]   Past Medical History:  Diagnosis Date    Abnormal weight loss 05/06/2021    Rapid weight loss    Abnormal weight loss 06/08/2020    Rapid weight loss    Acute vaginitis 05/15/2020    Acute vaginitis    ADHD (attention deficit hyperactivity disorder) 2016    Allergy status to unspecified drugs, medicaments and biological substances     History of seasonal allergies    Anorexia 09/10/2021    Poor appetite    Anxiety 2017    Body mass index (BMI)40.0-44.9, adult 11/04/2019    Body mass index (BMI) of 40.0 to 44.9 in adult    Encounter for gynecological examination (general) (routine) without abnormal findings 07/12/2019    Pap smear, as part of routine gynecological examination    Encounter for gynecological examination (general) (routine) without abnormal findings 05/24/2021    Encounter for annual routine gynecological examination    Encounter for gynecological examination (general) (routine) without abnormal findings     Women's annual routine gynecological examination    Encounter for immunization 09/21/2022    Encounter for administration of vaccine    Encounter for other preprocedural examination 06/28/2019    Pre-operative clearance    Encounter for other specified surgical aftercare 05/13/2022    Encounter for postoperative care    Encounter for pregnancy test, result  negative     Urine pregnancy test negative    Encounter for screening for malignant neoplasm of colon 09/10/2021    Screening for colorectal cancer    Encounter for screening for other suspected endocrine disorder 06/08/2020    Screening for endocrine, nutritional, metabolic and immunity disorder    Encounter for screening for other suspected endocrine disorder 06/08/2020    Screening for endocrine, nutritional, metabolic and immunity disorder    Encounter for screening for other viral diseases 07/26/2021    Encounter for hepatitis C screening test for low risk patient    Flushing 05/25/2021    Flushing    GERD (gastroesophageal reflux disease) 2018    Gluteal tendinitis, left hip 06/28/2021    Gluteal tendinitis of left buttock    Mastodynia 05/07/2014    Breast pain    Menopausal and female climacteric states 05/07/2014    Hot flash, menopausal    Morbid (severe) obesity due to excess calories ([facility]) 08/01/2019    Psychological factors affecting morbid obesity    Nausea 09/10/2021    Nausea in adult    Other acute postprocedural pain     Acute post-operative pain    Other amnesia 06/03/2015    Memory difficulty    Other symptoms and signs involving the musculoskeletal system 06/27/2019    Weakness of left lower extremity    Overweight 06/08/2020    Overweight and obesity    Pain in left ankle and joints of left foot 02/24/2020    Chronic pain of left ankle    Pain in unspecified ankle and joints of unspecified foot 06/27/2019    Ankle joint pain    Personal history of (healed) traumatic fracture 06/26/2019    History of fracture of left ankle    Personal history of other diseases of the musculoskeletal system and connective tissue 09/23/2019    History of low back pain    Personal history of other diseases of the nervous system and sense organs 10/14/2019    History of obstructive sleep apnea    Personal history of other infectious and parasitic diseases     History of candidiasis of vagina    Personal history of  other infectious and parasitic diseases 07/28/2022    History of herpes genitalis    Personal history of other medical treatment 05/06/2021    History of screening mammography    Personal history of other medical treatment 11/08/2021    History of screening mammography    Personal history of other specified conditions     History of postoperative nausea and vomiting    Unspecified asthma, uncomplicated ([facility]) 09/21/2022    Asthma    Visual impairment 1993   [2]   Current Outpatient Medications:     acetaminophen (Pain Relief ES, acetaminophen,) 500 mg tablet, Take 1 tablet (500 mg) by mouth every 4 hours if needed for mild pain (1 - 3)., Disp: 60 tablet, Rfl: 3    albuterol 90 mcg/actuation inhaler, Inhale 2 puffs every 4 hours if needed for wheezing or shortness of breath., Disp: 6.7 g, Rfl: 4    benzoyl peroxide 5 % external wash, Apply 1 Application topically once daily. In the morning, Disp: 237 g, Rfl: 11    biotin 10 mg tablet, Take 1 tablet (10 mg) by mouth once daily., Disp: , Rfl:     cholecalciferol, vitamin D3, (VITAMIN D3 ORAL), Take by mouth., Disp: , Rfl:     clindamycin (Cleocin T) 1 % lotion, Apply topically 2 times a day., Disp: 60 mL, Rfl: 11    fluticasone (Flonase) 50 mcg/actuation nasal spray, Administer 1 spray into each nostril 2 times a day., Disp: 16 g, Rfl: 5    guaiFENesin (Mucinex) 600 mg 12 hr tablet, 1 tablet as needed Orally every 12 hrs 30 days, Disp: 60 tablet, Rfl: 4    ipratropium-albuteroL (Duo-Neb) 0.5-2.5 mg/3 mL nebulizer solution, 3 mL Inhalation every 4 hrs As needed for shortness of breath 90 days, Disp: 1620 mL, Rfl: 4    loratadine (Claritin) 10 mg tablet, Take 1 tablet (10 mg) by mouth once daily., Disp: , Rfl:     MAGNESIUM GLYCINATE ORAL, Take by mouth., Disp: , Rfl:     MAGNESIUM L-THREONATE ORAL, Take by mouth., Disp: , Rfl:     melatonin 3 mg tablet, Take 1 tablet (3 mg) by mouth once daily at bedtime., Disp: 30 tablet, Rfl: 11    mometasone-formoterol (Dulera)  200-5 mcg/actuation inhaler, 2 puffs Inhalation Twice a day 30 days, Disp: 13 g, Rfl: 4    montelukast (Singulair) 10 mg tablet, Take 1 tablet (10 mg) by mouth once daily at bedtime., Disp: 30 tablet, Rfl: 3    multivitamin tablet, Take 1 tablet by mouth once daily., Disp: , Rfl:     polyethylene glycol (Glycolax, Miralax) 17 gram packet, Take 17 g by mouth once daily into 8 ounces of fluid., Disp: 90 packet, Rfl: 0    tretinoin (Retin-A) 0.1 % cream, Apply 1 Application topically once daily at bedtime. Start 1-2 nights per week 1-2 weeks, then increase to every other night, then every night as tolerated, Disp: 45 g, Rfl: 11    valACYclovir (Valtrex) 500 mg tablet, Take 1 tablet (500 mg) by mouth once daily., Disp: 90 tablet, Rfl: 1    Vitamin B-12 5,000 mcg tablet, sublingual, DISSOLVE ONE TABLET UNDER TONGUE ONCE DAILY, Disp: 90 tablet, Rfl: 0    vitamin E 180 mg (400 units) capsule, Take 1 capsule (180 mg) by mouth once daily., Disp: , Rfl:     Xolair 150 mg/mL subcutaneous syringe, Inject 1 mL (150 mg) under the skin every 14 (fourteen) days., Disp: , Rfl:   [3]   Social History  Tobacco Use    Smoking status: Never    Smokeless tobacco: Never   Vaping Use    Vaping status: Never Used   Substance Use Topics    Alcohol use: Not Currently     Alcohol/week: 0.0 standard drinks of alcohol     Comment: 1drink a month    Drug use: Never   [4] No Known Allergies

## 2025-07-09 ENCOUNTER — OFFICE VISIT (OUTPATIENT)
Dept: OTOLARYNGOLOGY | Facility: CLINIC | Age: 50
End: 2025-07-09
Payer: COMMERCIAL

## 2025-07-09 DIAGNOSIS — Z98.84 HISTORY OF ROUX-EN-Y GASTRIC BYPASS: ICD-10-CM

## 2025-07-09 DIAGNOSIS — M79.7 FIBROMYALGIA: ICD-10-CM

## 2025-07-09 DIAGNOSIS — J45.40 MODERATE PERSISTENT ASTHMA WITHOUT COMPLICATION (HHS-HCC): Primary | ICD-10-CM

## 2025-07-09 DIAGNOSIS — R07.0 THROAT PAIN: ICD-10-CM

## 2025-07-10 PROCEDURE — RXMED WILLOW AMBULATORY MEDICATION CHARGE

## 2025-07-11 ENCOUNTER — OFFICE VISIT (OUTPATIENT)
Dept: SURGERY | Facility: CLINIC | Age: 50
End: 2025-07-11
Payer: COMMERCIAL

## 2025-07-11 ENCOUNTER — PHARMACY VISIT (OUTPATIENT)
Dept: PHARMACY | Facility: CLINIC | Age: 50
End: 2025-07-11
Payer: MEDICAID

## 2025-07-11 VITALS
DIASTOLIC BLOOD PRESSURE: 79 MMHG | BODY MASS INDEX: 21.5 KG/M2 | SYSTOLIC BLOOD PRESSURE: 115 MMHG | HEIGHT: 67 IN | WEIGHT: 137 LBS | HEART RATE: 75 BPM

## 2025-07-11 DIAGNOSIS — K59.09 CHRONIC CONSTIPATION: ICD-10-CM

## 2025-07-11 DIAGNOSIS — R15.9 INCONTINENCE OF FECES, UNSPECIFIED FECAL INCONTINENCE TYPE: Primary | ICD-10-CM

## 2025-07-11 DIAGNOSIS — N81.89 PELVIC FLOOR WEAKNESS: ICD-10-CM

## 2025-07-11 PROCEDURE — 46600 DIAGNOSTIC ANOSCOPY SPX: CPT | Performed by: NURSE PRACTITIONER

## 2025-07-11 PROCEDURE — 99213 OFFICE O/P EST LOW 20 MIN: CPT | Mod: 25 | Performed by: NURSE PRACTITIONER

## 2025-07-11 RX ORDER — LANOLIN ALCOHOL/MO/W.PET/CERES
100 CREAM (GRAM) TOPICAL DAILY
COMMUNITY

## 2025-07-11 NOTE — PROGRESS NOTES
History Of Present Illness  Deirdre Rodriguez is a 49 y.o. female with a hx of a gastric bypass who is presenting with fecal incontinence.     She started to have incontincne of stool over the past year and it has been progressively getting worse.      She has chronic constipation and will have a BM every 2-3 days and that has been her norm all of her life.  The stool is soft and formed.  She tried taking Miralax but that make the incotninence worse.  No c/o any anal pain or bleeding.  She was taking a fiber supplement but it was not helping her have better BM's or bulking her stools.   She has leakage of stool every day to every other day and throughout the day.  Most of the time she does not even know that she had an accident of stool.  No hx of any perianal surgeries.  She had 1 vaginal birth with an episiotomy.      No Biofeedback yet  No anal manometry or MRI defogram    Colonocopy 2021 negative    Past Medical History  She has a past medical history of Abnormal weight loss (05/06/2021), Abnormal weight loss (06/08/2020), Acute vaginitis (05/15/2020), ADHD (attention deficit hyperactivity disorder) (2016), Allergy status to unspecified drugs, medicaments and biological substances, Anorexia (09/10/2021), Anxiety (2017), Body mass index (BMI)40.0-44.9, adult (11/04/2019), Encounter for gynecological examination (general) (routine) without abnormal findings (07/12/2019), Encounter for gynecological examination (general) (routine) without abnormal findings (05/24/2021), Encounter for gynecological examination (general) (routine) without abnormal findings, Encounter for immunization (09/21/2022), Encounter for other preprocedural examination (06/28/2019), Encounter for other specified surgical aftercare (05/13/2022), Encounter for pregnancy test, result negative, Encounter for screening for malignant neoplasm of colon (09/10/2021), Encounter for screening for other suspected endocrine disorder (06/08/2020),  Encounter for screening for other suspected endocrine disorder (06/08/2020), Encounter for screening for other viral diseases (07/26/2021), Flushing (05/25/2021), GERD (gastroesophageal reflux disease) (2018), Gluteal tendinitis, left hip (06/28/2021), Mastodynia (05/07/2014), Menopausal and female climacteric states (05/07/2014), Morbid (severe) obesity due to excess calories (Multi) (08/01/2019), Nausea (09/10/2021), Other acute postprocedural pain, Other amnesia (06/03/2015), Other symptoms and signs involving the musculoskeletal system (06/27/2019), Overweight (06/08/2020), Pain in left ankle and joints of left foot (02/24/2020), Pain in unspecified ankle and joints of unspecified foot (06/27/2019), Personal history of (healed) traumatic fracture (06/26/2019), Personal history of other diseases of the musculoskeletal system and connective tissue (09/23/2019), Personal history of other diseases of the nervous system and sense organs (10/14/2019), Personal history of other infectious and parasitic diseases, Personal history of other infectious and parasitic diseases (07/28/2022), Personal history of other medical treatment (05/06/2021), Personal history of other medical treatment (11/08/2021), Personal history of other specified conditions, Unspecified asthma, uncomplicated ([facility]) (09/21/2022), and Visual impairment (1993).    She has no past medical history of Personal history of irradiation.    Surgical History  She has a past surgical history that includes Other surgical history (04/22/2014); Other surgical history (07/26/2021); Other surgical history (10/09/2019); Other surgical history (12/26/2018); Hysterectomy (3-29-22); Cosmetic surgery (1999); Augmentation mammaplasty (1999); Fracture surgery (12/2018); Joint replacement (11/11/22); and Bariatric Surgery (9-).     Social History  She reports that she has never smoked. She has never used smokeless tobacco. She reports that she does not currently  use alcohol. She reports that she does not use drugs.    Family History  Family History[1]     Allergies  Patient has no known allergies.    Review of Systems   All other systems reviewed and are negative.       Physical Exam  Exam conducted with a chaperone present.   Constitutional:       Appearance: Normal appearance.   HENT:      Head: Normocephalic and atraumatic.   Pulmonary:      Effort: Pulmonary effort is normal.   Musculoskeletal:         General: Normal range of motion.   Skin:     General: Skin is warm and dry.   Neurological:      General: No focal deficit present.      Mental Status: She is alert and oriented to person, place, and time.   Psychiatric:         Mood and Affect: Mood normal.         Behavior: Behavior normal.       Anoscopy    Date/Time: 7/11/2025 1:40 PM    Performed by: BLANCA Holm  Authorized by: BLANCA Holm    Consent:     Consent obtained:  Verbal    Consent given by:  Patient    Risks, benefits, and alternatives were discussed: yes    Universal protocol:     Procedure explained and questions answered to patient or proxy's satisfaction: yes      Patient identity confirmed:  Verbally with patient  Post-procedure details:     Procedure completion:  Tolerated  Comments:      No external hemorrhoids.  Weak tone on SHERRY.  Discomfort more in the right anterior lateral position with a little firmness to the area.       Last Recorded Vitals  /79   Pulse 75   Wt 62.1 kg (137 lb)        Assessment/Plan   Deirdre has fecal incontinence and chronic constipation.  She will schedule to have a MRI defogram and anal manometry for further evaluation.  She will start taking Metamucil 1-2x/day to keep her stools soft and bulked.  She will schedule to start Biofeedback to help strengthen her anus and pelvic floor.  We talked a little about the SNS device in the future if needed.   She will call with any issues and I will call her with the results of the tests when  done.       Leslie Rivas, APRN-CNP         [1]   Family History  Problem Relation Name Age of Onset    Alcohol abuse Mother Delphine Rodriguez     Hypertension Maternal Grandmother Lizett Yoo     Arthritis Maternal Grandmother Lizett Yoo     Alcohol abuse Mother Delphine Rodriguez     Drug abuse Mother Delphine Rodriguez     Arthritis Maternal Grandmother Lizett Yoo     Hypertension Maternal Grandmother Lizett Yoo     Drug abuse Mother Delphine Rodriguez

## 2025-07-14 ENCOUNTER — APPOINTMENT (OUTPATIENT)
Dept: DERMATOLOGY | Facility: CLINIC | Age: 50
End: 2025-07-14
Payer: COMMERCIAL

## 2025-07-14 DIAGNOSIS — L70.0 ACNE VULGARIS: Primary | ICD-10-CM

## 2025-07-14 DIAGNOSIS — L57.8 DIFFUSE PHOTODAMAGE OF SKIN: ICD-10-CM

## 2025-07-14 DIAGNOSIS — L21.9 SEBORRHEIC DERMATITIS: ICD-10-CM

## 2025-07-14 PROCEDURE — RXMED WILLOW AMBULATORY MEDICATION CHARGE

## 2025-07-14 PROCEDURE — 99214 OFFICE O/P EST MOD 30 MIN: CPT | Performed by: DERMATOLOGY

## 2025-07-14 RX ORDER — CLINDAMYCIN PHOSPHATE 10 UG/ML
LOTION TOPICAL 2 TIMES DAILY
Qty: 60 ML | Refills: 11 | Status: SHIPPED | OUTPATIENT
Start: 2025-07-14 | End: 2026-07-14

## 2025-07-14 RX ORDER — KETOCONAZOLE 20 MG/G
CREAM TOPICAL
Qty: 60 G | Refills: 11 | Status: SHIPPED | OUTPATIENT
Start: 2025-07-14

## 2025-07-14 RX ORDER — TRETINOIN 1 MG/G
1 CREAM TOPICAL NIGHTLY
Qty: 45 G | Refills: 11 | Status: SHIPPED | OUTPATIENT
Start: 2025-07-14

## 2025-07-14 RX ORDER — BENZOYL PEROXIDE 50 MG/ML
1 LIQUID TOPICAL DAILY
Qty: 237 G | Refills: 11 | Status: SHIPPED | OUTPATIENT
Start: 2025-07-14

## 2025-07-14 ASSESSMENT — DERMATOLOGY QUALITY OF LIFE (QOL) ASSESSMENT
ARE THERE EXCLUSIONS OR EXCEPTIONS FOR THE QUALITY OF LIFE ASSESSMENT: NO
WHAT SINGLE SKIN CONDITION LISTED BELOW IS THE PATIENT ANSWERING THE QUALITY-OF-LIFE ASSESSMENT QUESTIONS ABOUT: ACNE
RATE HOW BOTHERED YOU ARE BY EFFECTS OF YOUR SKIN PROBLEMS ON YOUR ACTIVITIES (EG, GOING OUT, ACCOMPLISHING WHAT YOU WANT, WORK ACTIVITIES OR YOUR RELATIONSHIPS WITH OTHERS): 0 - NEVER BOTHERED
RATE HOW EMOTIONALLY BOTHERED YOU ARE BY YOUR SKIN PROBLEM (FOR EXAMPLE, WORRY, EMBARRASSMENT, FRUSTRATION): 0 - NEVER BOTHERED
DATE THE QUALITY-OF-LIFE ASSESSMENT WAS COMPLETED: 67400
RATE HOW BOTHERED YOU ARE BY SYMPTOMS OF YOUR SKIN PROBLEM (EG, ITCHING, STINGING BURNING, HURTING OR SKIN IRRITATION): 0 - NEVER BOTHERED

## 2025-07-14 ASSESSMENT — ITCH NUMERIC RATING SCALE: HOW SEVERE IS YOUR ITCHING?: 0

## 2025-07-14 ASSESSMENT — PATIENT GLOBAL ASSESSMENT (PGA): PATIENT GLOBAL ASSESSMENT: PATIENT GLOBAL ASSESSMENT:  2 - MILD

## 2025-07-14 ASSESSMENT — DERMATOLOGY PATIENT ASSESSMENT: DO YOU HAVE ANY NEW OR CHANGING LESIONS: NO

## 2025-07-14 NOTE — Clinical Note
Acne Vulgaris - mild flare on face; mild-moderate; mixed comedonal and inflammatory types.  The nature of this condition and treatment options were discussed extensively with the patient today.  At this time, I recommend she continue combination topical therapy with Benzoyl Peroxide 5% creamy wash once daily in the morning; Clindamycin 1% lotion twice daily or up to 3-4 times per day as needed for active lesions; and Tretinoin 0.1% cream at night.  The risks, benefits, and side effects of these medications, including the redness, dryness, and irritation expected with BP and Tretinoin use, were discussed; the patient was instructed to try to titrate up her frequency of application of Tretinoin 0.1% cream to every night of the week as tolerated without excessive redness or irritation.  I also informed the patient it will likely take at least 2-3 months to notice any significant improvement with the treatment regimen outlined above, and she was instructed to discontinue use of these medications should she become or attempt to become pregnant at any time in the future.  She expressed understanding and is in agreement with this plan.

## 2025-07-14 NOTE — Clinical Note
Scattered on the patient's face, there are several open and closed comedones; a few erythematous, inflammatory papules, mainly on her bilateral cheeks; and several hyperpigmented macules consistent with post-inflammatory hyperpigmentation

## 2025-07-14 NOTE — PROGRESS NOTES
Subjective     Deirdre Rodriguez is a 49 y.o. female who presents for the following: Acne.  The patient states she is currently using BP wash every morning, clindamycin 1% lotion twice a day, and tretinoin 0.1% cream every other night, because she experiences dry, scaly patches on her face if she uses it more frequently, especially on her chin, around her nose, and between her eyebrows, and she denies any major flares of her acne since her last visit on 8/8/24.  She denies any other new, changing, or concerning skin lesions since her last visit; no bleeding, itching, or burning lesions.      Review of Systems:  No other skin or systemic complaints other than what is documented elsewhere in the note.    The following portions of the chart were reviewed this encounter and updated as appropriate:       Skin Cancer History  Biopsy Log Book  No skin cancers from Specimen Tracking.    Additional History      Specialty Problems          Dermatology Problems    Lipoma of skin and subcutaneous tissue       Past Dermatologic / Past Relevant Medical History:    - history of acne  - no h/o skin cancer    Family History:    Daughter - acne  No family history of melanoma or skin cancer    Social History:    She states she is currently unemployed, and her daughter is a patient in our office as well    Allergies:  Patient has no known allergies.    Current Medications / CAM's:  Current Medications[1]     Objective   Well appearing patient in no apparent distress; mood and affect are within normal limits.    A skin examination was performed including the face and neck. All findings within normal limits unless otherwise noted below.    Assessment/Plan   Skin Exam  1. ACNE VULGARIS  Head - Anterior (Face)  Scattered on the patient's face, there are several open and closed comedones; a few erythematous, inflammatory papules, mainly on her bilateral cheeks; and several hyperpigmented macules consistent with post-inflammatory  hyperpigmentation  Acne Vulgaris - mild flare on face; mild-moderate; mixed comedonal and inflammatory types.  The nature of this condition and treatment options were discussed extensively with the patient today.  At this time, I recommend she continue combination topical therapy with Benzoyl Peroxide 5% creamy wash once daily in the morning; Clindamycin 1% lotion twice daily or up to 3-4 times per day as needed for active lesions; and Tretinoin 0.1% cream at night.  The risks, benefits, and side effects of these medications, including the redness, dryness, and irritation expected with BP and Tretinoin use, were discussed; the patient was instructed to try to titrate up her frequency of application of Tretinoin 0.1% cream to every night of the week as tolerated without excessive redness or irritation.  I also informed the patient it will likely take at least 2-3 months to notice any significant improvement with the treatment regimen outlined above, and she was instructed to discontinue use of these medications should she become or attempt to become pregnant at any time in the future.  She expressed understanding and is in agreement with this plan.  Related Medications  benzoyl peroxide 5 % external wash  Apply 1 Application topically once daily. In the morning  clindamycin (Cleocin T) 1 % lotion  Apply topically 2 times a day.  tretinoin (Retin-A) 0.1 % cream  Apply 1 Application topically once daily at bedtime. Start 1-2 nights per week 1-2 weeks, then increase to every other night, then every night as tolerated  2. SEBORRHEIC DERMATITIS  Head - Anterior (Face)  On the patient's face, mainly the glabella and bilateral eyebrows and perinasal creases, there are pink, scaly patches with whitish-yellowish, greasy scale  Seborrheic Dermatitis - face.  The potentially chronic and intermittently flaring nature of this condition and treatment options were discussed extensively with the patient today.  At this time, I  recommend topical anti-fungal therapy with Ketoconazole 2% cream, which the patient was instructed to apply twice daily to the affected areas of the face.  The risks, benefits, and side effects of this medication were discussed.  The patient expressed understanding and is in agreement with this plan.  ketoconazole (NIZOral) 2 % cream - Head - Anterior (Face)  Apply twice daily to affected areas of face  3. DIFFUSE PHOTODAMAGE OF SKIN  Photodistributed  Diffuse photodamage with actinic changes with telangiectasia and mottled pigmentation in sun-exposed areas.  Photodamage.  The signs and symptoms of skin cancer were reviewed and the patient was advised to practice sun protection and sun avoidance, use daily sunscreen, and perform regular self skin exams.  Sun protection was discussed, including avoiding the mid-day sun, wearing a sunscreen with SPF at least 50, and stressing the need for reapplication of sunscreen and applying more than they think they need.          [1]   Current Outpatient Medications:     acetaminophen (Pain Relief ES, acetaminophen,) 500 mg tablet, Take 1 tablet (500 mg) by mouth every 4 hours if needed for mild pain (1 - 3)., Disp: 60 tablet, Rfl: 3    albuterol 90 mcg/actuation inhaler, Inhale 2 puffs every 4 hours if needed for wheezing or shortness of breath., Disp: 6.7 g, Rfl: 4    ascorbic acid/collagen hydr (COLLAGEN PLUS VITAMIN C ORAL), Take by mouth., Disp: , Rfl:     benzoyl peroxide 5 % external wash, Apply 1 Application topically once daily. In the morning, Disp: 237 g, Rfl: 11    cholecalciferol, vitamin D3, (VITAMIN D3 ORAL), Take by mouth., Disp: , Rfl:     clindamycin (Cleocin T) 1 % lotion, Apply topically 2 times a day., Disp: 60 mL, Rfl: 11    fluticasone (Flonase) 50 mcg/actuation nasal spray, Administer 1 spray into each nostril 2 times a day., Disp: 16 g, Rfl: 5    guaiFENesin (Mucinex) 600 mg 12 hr tablet, 1 tablet as needed Orally every 12 hrs 30 days, Disp: 60 tablet,  Rfl: 4    ipratropium-albuteroL (Duo-Neb) 0.5-2.5 mg/3 mL nebulizer solution, 3 mL Inhalation every 4 hrs As needed for shortness of breath 90 days, Disp: 1620 mL, Rfl: 4    ketoconazole (NIZOral) 2 % cream, Apply twice daily to affected areas of face, Disp: 60 g, Rfl: 11    loratadine (Claritin) 10 mg tablet, Take 1 tablet (10 mg) by mouth once daily., Disp: , Rfl:     MAGNESIUM GLYCINATE ORAL, Take by mouth., Disp: , Rfl:     MAGNESIUM L-THREONATE ORAL, Take by mouth., Disp: , Rfl:     melatonin 3 mg tablet, Take 1 tablet (3 mg) by mouth once daily at bedtime., Disp: 30 tablet, Rfl: 11    mometasone-formoterol (Dulera) 200-5 mcg/actuation inhaler, 2 puffs Inhalation Twice a day 30 days, Disp: 13 g, Rfl: 4    montelukast (Singulair) 10 mg tablet, Take 1 tablet (10 mg) by mouth once daily at bedtime., Disp: 30 tablet, Rfl: 3    multivitamin tablet, Take 1 tablet by mouth once daily., Disp: , Rfl:     polyethylene glycol (Glycolax, Miralax) 17 gram packet, Take 17 g by mouth once daily into 8 ounces of fluid., Disp: 90 packet, Rfl: 0    thiamine 100 mg tablet, Take 1 tablet (100 mg) by mouth once daily., Disp: , Rfl:     tretinoin (Retin-A) 0.1 % cream, Apply 1 Application topically once daily at bedtime. Start 1-2 nights per week 1-2 weeks, then increase to every other night, then every night as tolerated, Disp: 45 g, Rfl: 11    valACYclovir (Valtrex) 500 mg tablet, Take 1 tablet (500 mg) by mouth once daily., Disp: 90 tablet, Rfl: 1    Vitamin B-12 5,000 mcg tablet, sublingual, DISSOLVE ONE TABLET UNDER TONGUE ONCE DAILY, Disp: 90 tablet, Rfl: 0    Xolair 150 mg/mL subcutaneous syringe, Inject 1 mL (150 mg) under the skin every 14 (fourteen) days., Disp: , Rfl:

## 2025-07-14 NOTE — Clinical Note
Seborrheic Dermatitis - face.  The potentially chronic and intermittently flaring nature of this condition and treatment options were discussed extensively with the patient today.  At this time, I recommend topical anti-fungal therapy with Ketoconazole 2% cream, which the patient was instructed to apply twice daily to the affected areas of the face.  The risks, benefits, and side effects of this medication were discussed.  The patient expressed understanding and is in agreement with this plan.

## 2025-07-16 ENCOUNTER — PHARMACY VISIT (OUTPATIENT)
Dept: PHARMACY | Facility: CLINIC | Age: 50
End: 2025-07-16
Payer: MEDICAID

## 2025-07-30 DIAGNOSIS — J45.40 MODERATE PERSISTENT ASTHMA WITHOUT COMPLICATION (HHS-HCC): ICD-10-CM

## 2025-07-30 RX ORDER — MONTELUKAST SODIUM 10 MG/1
10 TABLET ORAL NIGHTLY
Qty: 30 TABLET | Refills: 3 | Status: SHIPPED | OUTPATIENT
Start: 2025-07-30

## 2025-07-31 ENCOUNTER — APPOINTMENT (OUTPATIENT)
Dept: GASTROENTEROLOGY | Facility: HOSPITAL | Age: 50
End: 2025-07-31
Payer: COMMERCIAL

## 2025-08-04 ENCOUNTER — APPOINTMENT (OUTPATIENT)
Dept: GASTROENTEROLOGY | Facility: CLINIC | Age: 50
End: 2025-08-04
Payer: COMMERCIAL

## 2025-08-08 PROCEDURE — RXMED WILLOW AMBULATORY MEDICATION CHARGE

## 2025-08-11 ENCOUNTER — PHARMACY VISIT (OUTPATIENT)
Dept: PHARMACY | Facility: CLINIC | Age: 50
End: 2025-08-11
Payer: MEDICAID

## 2025-08-14 PROCEDURE — RXMED WILLOW AMBULATORY MEDICATION CHARGE

## 2025-08-15 ENCOUNTER — PHARMACY VISIT (OUTPATIENT)
Dept: PHARMACY | Facility: CLINIC | Age: 50
End: 2025-08-15
Payer: MEDICAID

## 2025-08-15 DIAGNOSIS — J30.2 ALLERGIC RHINOCONJUNCTIVITIS, SEASONAL AND PERENNIAL: Primary | ICD-10-CM

## 2025-08-15 DIAGNOSIS — H10.10 ALLERGIC RHINOCONJUNCTIVITIS, SEASONAL AND PERENNIAL: Primary | ICD-10-CM

## 2025-08-15 DIAGNOSIS — J30.89 ALLERGIC RHINOCONJUNCTIVITIS, SEASONAL AND PERENNIAL: Primary | ICD-10-CM

## 2025-08-15 PROCEDURE — RXMED WILLOW AMBULATORY MEDICATION CHARGE

## 2025-08-15 RX ORDER — LORATADINE 10 MG/1
10 TABLET ORAL DAILY
Qty: 30 TABLET | Refills: 11 | Status: SHIPPED | OUTPATIENT
Start: 2025-08-15

## 2025-08-18 ENCOUNTER — APPOINTMENT (OUTPATIENT)
Dept: RADIOLOGY | Facility: HOSPITAL | Age: 50
End: 2025-08-18
Payer: COMMERCIAL

## 2025-08-18 ENCOUNTER — OFFICE VISIT (OUTPATIENT)
Dept: DERMATOLOGY | Facility: CLINIC | Age: 50
End: 2025-08-18
Payer: COMMERCIAL

## 2025-08-18 ENCOUNTER — PHARMACY VISIT (OUTPATIENT)
Dept: PHARMACY | Facility: CLINIC | Age: 50
End: 2025-08-18
Payer: MEDICAID

## 2025-08-18 ENCOUNTER — APPOINTMENT (OUTPATIENT)
Dept: OBSTETRICS AND GYNECOLOGY | Facility: CLINIC | Age: 50
End: 2025-08-18
Payer: COMMERCIAL

## 2025-08-18 DIAGNOSIS — L24.5 IRRITANT CONTACT DERMATITIS DUE TO OTHER CHEMICAL PRODUCTS: Primary | ICD-10-CM

## 2025-08-18 DIAGNOSIS — L30.9 DERMATITIS: Primary | ICD-10-CM

## 2025-08-18 PROCEDURE — 99213 OFFICE O/P EST LOW 20 MIN: CPT | Performed by: NURSE PRACTITIONER

## 2025-08-18 PROCEDURE — 1036F TOBACCO NON-USER: CPT | Performed by: NURSE PRACTITIONER

## 2025-08-18 RX ORDER — HYDROCORTISONE 25 MG/G
CREAM TOPICAL
Qty: 60 G | Refills: 0 | Status: SHIPPED | OUTPATIENT
Start: 2025-08-18 | End: 2025-08-18

## 2025-08-18 RX ORDER — HYDROCORTISONE 25 MG/G
CREAM TOPICAL
Qty: 60 G | Refills: 0 | Status: SHIPPED | OUTPATIENT
Start: 2025-08-18

## 2025-08-25 ENCOUNTER — APPOINTMENT (OUTPATIENT)
Dept: GASTROENTEROLOGY | Facility: CLINIC | Age: 50
End: 2025-08-25
Payer: COMMERCIAL

## 2025-09-03 PROCEDURE — RXMED WILLOW AMBULATORY MEDICATION CHARGE

## 2025-09-05 ENCOUNTER — PHARMACY VISIT (OUTPATIENT)
Dept: PHARMACY | Facility: CLINIC | Age: 50
End: 2025-09-05
Payer: MEDICAID

## 2026-05-26 ENCOUNTER — APPOINTMENT (OUTPATIENT)
Dept: PRIMARY CARE | Facility: CLINIC | Age: 51
End: 2026-05-26
Payer: COMMERCIAL

## 2026-06-08 ENCOUNTER — APPOINTMENT (OUTPATIENT)
Dept: OBSTETRICS AND GYNECOLOGY | Facility: CLINIC | Age: 51
End: 2026-06-08
Payer: COMMERCIAL

## 2026-07-16 ENCOUNTER — APPOINTMENT (OUTPATIENT)
Dept: DERMATOLOGY | Facility: CLINIC | Age: 51
End: 2026-07-16
Payer: COMMERCIAL